# Patient Record
Sex: FEMALE | Employment: OTHER | URBAN - METROPOLITAN AREA
[De-identification: names, ages, dates, MRNs, and addresses within clinical notes are randomized per-mention and may not be internally consistent; named-entity substitution may affect disease eponyms.]

---

## 2023-10-03 ENCOUNTER — TRANSFERRED RECORDS (OUTPATIENT)
Dept: HEALTH INFORMATION MANAGEMENT | Facility: CLINIC | Age: 43
End: 2023-10-03
Payer: COMMERCIAL

## 2023-10-13 ENCOUNTER — TRANSFERRED RECORDS (OUTPATIENT)
Dept: HEALTH INFORMATION MANAGEMENT | Facility: CLINIC | Age: 43
End: 2023-10-13
Payer: COMMERCIAL

## 2023-10-18 ENCOUNTER — TRANSCRIBE ORDERS (OUTPATIENT)
Dept: OTHER | Age: 43
End: 2023-10-18

## 2023-10-18 ENCOUNTER — PRE VISIT (OUTPATIENT)
Dept: ONCOLOGY | Facility: CLINIC | Age: 43
End: 2023-10-18
Payer: COMMERCIAL

## 2023-10-18 ENCOUNTER — PATIENT OUTREACH (OUTPATIENT)
Dept: ONCOLOGY | Facility: CLINIC | Age: 43
End: 2023-10-18
Payer: COMMERCIAL

## 2023-10-18 DIAGNOSIS — N83.8 OVARIAN MASS: Primary | ICD-10-CM

## 2023-10-18 NOTE — TELEPHONE ENCOUNTER
"RECORDS STATUS - ALL OTHER DIAGNOSIS      Referring Provider/Location:  Noris Bond, [facility]  Dx and Code: Ovarian mass [N83.8]   Appt Date:  10.27.23  Provider: Ange    Action Taken  Date/Description  TJ     Pre VIsit October 24, 2023  2:36 PM   Call to Pt and the number is not working.  I called additional contact Oscar, which is Pt's signigicant other.  He was able to get a message to her and she immediately called me back.  She confirmed that all records are at [facility] and [facility] and that there are images at [facility].   Pt gave verbal permission to pull in electronic records and they are now in CE.  Pt also stated we can call Oscar anytime to get ahold of her.     Records Requested  TJ   Clinic name Comments/Action Taken   [facility] October 24, 2023 2:51 PM    Called and requested images from 2021 and 2022 be pushed to PACS    [facility] October 24, 2023 2:51 PM    Called and requested images be pushed to PACS and reports be faxed     Action October 24, 2023 3:13 PM TJ   Incoming Records Reports received from [facility] (US Pelvis and CT Abd Pelvis) and sent to [facility] for upload     Imaging Received  October 24, 2023    3:03 PM  TJ   Action: Images from [facility] and [facility] received and resolved to PACS.       RECORDS RECEIVED FROM: [employer]    Appt Date: TBD NN WQ     Action    Action Taken 10/18/2023 1:45pm RON Mcclain is in pt Tonya's chart. I can't change the appt status to \"in progress\" yet.     I called pt Tonya to get verbal consent to pull [facility] records into CE. Her phone went to  twice. I don't believe there was an option to leave a .       NOTES STATUS DETAILS   OFFICE NOTE from referring provider Complete  referral recevied from Noris Bond at [facility]   OFFICE NOTE from medical oncologist     DISCHARGE SUMMARY from hospital     DISCHARGE REPORT from the ER     OPERATIVE REPORT     MEDICATION LIST     CLINICAL TRIAL TREATMENTS TO DATE     LABS     PATHOLOGY REPORTS     ANYTHING RELATED TO " DIAGNOSIS     GENONOMIC TESTING     TYPE:     IMAGING (NEED IMAGES & REPORT)     CT SCANS     MRI     MAMMO     ULTRASOUND     PET

## 2023-10-18 NOTE — PROGRESS NOTES
New Patient Hematology / Oncology Nurse Navigator Note     Referral Date: 10/18/23    Referring provider:       Referring Clinic/Organization: [facility], [facility]      Referred to: GynOnc    Requested provider (if applicable): Dr. Guillory    Evaluation for :        Clinical History (per Nurse review of records provided):         Clinical Assessment / Barriers to Care (Per Nurse):  Pt lives in [place], [place]    Records Location: Care Everywhere     Records Needed:   Records from  per protocol- unable to pull in via CE without patient consent. Please obtain    Additional testing needed prior to consult:   N/A    Referral updates and Plan:   OUTGOING CALL to pt, number not in service.     Contacted  Complex scheduling department and spoke with Thu who states that is the only contact number they have for patient.     Sent a generic email to patient requesting call back regarding referral from . Provided my direct call back number.       Sarahi Bajwa, BSN, RN, PHN, OCN  Hematology/Oncology Nurse Navigator  [facility]  [phone]

## 2023-10-24 ENCOUNTER — DOCUMENTATION ONLY (OUTPATIENT)
Dept: ONCOLOGY | Facility: CLINIC | Age: 43
End: 2023-10-24
Payer: COMMERCIAL

## 2023-10-26 NOTE — PROGRESS NOTES
Consult Note  Gynecologic Oncology Clinic      Referring provider/Gynecologist:    Noris Bond  [facility]  [address] [place] [address]      Primary Care Provider:  Elle Barahona [facility] [address] [place] [address]           Patient: Tonya Mendoza  : 1980  Pronouns: she/her/hers       Date of Visit: Oct 27, 2023       Reason for visit: Pelvic mass.       History of Present Illness:  Tonya Mendoza is a 43 year old patient referred to the gyn onc clinic for evaluation and management of a pelvic mass. Medical history significant for class III morbid obesity, COPD, paranoid schizophrenia and anxiety, and leukemia for which she has received chemotherapy & radiation. History as follows:    22: Pelvic CT to evaluate left inguinal abscess.   PELVIC ORGANS: There is a 5.3 cm right ovarian cysts. Unremarkable left ovary and uterus.     10/3/23: Pelvic ultrasound to evaluate abnormal uterine bleeding. I have personally reviewed and interpreted the ultrasound images.  Predominantly cystic multiseptated mass with some areas that appear more solid.   UTERUS: 6.1 x 1.7 x 3.7 cm. Normal homogeneous echotexture.   ENDOMETRIUM: 7 mm.   RIGHT ADNEXA: 23.2 x 21.5 x 17.9 cm complex cystic lesion.   LEFT OVARY: Not visualized  OTHER: No significant free fluid within the cul-de-sac.      10/13/23: Abdomen, pelvic CT: I have personally reviewed and interpreted the CT images. Largely cystic, multiseptated mass with some areas which appear more solid.   ABDOMEN: BISHNU.  PELVIS: Very large pelvic lesions which appears to be a multiseptated cystic lesion measuring 23 x 17 x 23 cm. Uterus is normal in size.   No lymphadenopathy.     10/17/23: CA-125=16, CEA=3.4, Inhibin B 18.       Subjective:  Toyna Mendoza presents to the gyn onc clinic today for a scheduled consultation, accompanied by her boyfriend and PCA.   Tonya reports that she started having symptoms ~3  "months ago. Up to ~3 months ago, Tonya reports regular monthly periods. 3 months ago she started bleeding continuously with clots. She uses bladder control pads at at time, but cannot quantify how often she changes it.  She also has pain. Tonya reports that she has the \"worst pain ever.\" She reports pain from her upper abdomen, through her pelvis, the back and legs. She reports trouble walking because of pain. She reports weakness because she feels like when she stands up \"everything is going to fall out.\" Tonya has tried celebrex, tylenol, naproxen for pain with minimal relief.   At baseline, Tonya has a mobility chair that she uses most of the time. She rarely uses a cane, only uses it for very short distances. The indication for the chair was limited mobility due to Raynaud's syndrome and bad peripheral neuropathy due to uncontrolled diabetes.   Tonya reports that she cannot eat for the last month. She only tolerate liquids and soft foods. She has constipation and does not want to have bowel movements because it hurts; last bowel movement ~2 days ago. She reports pain with urination. She has urinary frequency. Tonya reports weight loss--She weighed 269 at her PCPs office last Friday (baseline at 300); 292 today. No chest pain/shortness of breath. Unknown lower extremity edema; her PCA reports she has chronic poor circulation. No other rashes, unexplained bruises, notable lymphadenopathy.      Fertility desires: Does not desire future fertility.      Medical History:  Past Medical History:   Diagnosis Date    Anxiety     COPD (chronic obstructive pulmonary disease) (H)     GERD (gastroesophageal reflux disease)     HTN (hypertension)     Leukemia (H) 2015    MDD (major depressive disorder)     Morbid obesity with BMI of 50.0-59.9, adult (H)     Paranoid schizophrenia (H)     PTSD (post-traumatic stress disorder)          Surgical History:  Past Surgical History:   Procedure Laterality Date    APPENDECTOMY      " "BREAST BIOPSY, RT/LT Right     Benign    ELBOW SURGERY Left     \"Pins in her left elbow\"    FOOT SURGERY Right     \"Plate in her foot\"          Gynecologic History:  Menstrual/Menopause status: Regular monthly periods until age 43y (see HPI).   Hormone therapy/Contraception status: None  History of abnormal cervical cancer screening:  None  History of STIS: None      Obstetric History:  OB History    Para Term  AB Living   0 0 0 0 0 0   SAB IAB Ectopic Multiple Live Births   0 0 0 0 0        Healthcare Maintenance:   Last cervical cancer screenin Result: \"Normal\" (per patient report; results not available for review)  Last breast cancer screenin22 Result: normal (fibroglandular densities)  Last colon cancer screening: Not indicated: Age <45-50 years   HPV vaccination status: Unvaccinated      Medications:   Current Outpatient Medications   Medication    albuterol (PROAIR HFA/PROVENTIL HFA/VENTOLIN HFA) 108 (90 Base) MCG/ACT inhaler    amLODIPine (NORVASC) 10 MG tablet    cariprazine (VRAYLAR) 3 MG capsule    celecoxib (CELEBREX) 100 MG capsule    cetirizine (ZYRTEC) 5 MG tablet    DULoxetine (CYMBALTA) 30 MG capsule    fluticasone (FLONASE) 50 MCG/ACT nasal spray    fluticasone-vilanterol (BREO ELLIPTA) 200-25 MCG/ACT inhaler    mometasone-formoterol (DULERA) 200-5 MCG/ACT inhaler    naproxen (NAPROSYN) 500 MG tablet    omeprazole (PRILOSEC) 40 MG DR capsule    Pregabalin (LYRICA) 200 MG capsule    sodium fluoride dental gel (PREVIDENT 5000 DRY MOUTH) 1.1 % GEL topical gel    spironolactone (ALDACTONE) 25 MG tablet    tiotropium (SPIRIVA HANDIHALER) 18 MCG inhaled capsule     No current facility-administered medications for this visit.          Allergies:   Allergies   Allergen Reactions    Codeine Nausea and Vomiting and Hives          Social History:  Patient lives independently.   Work status: on Disability. Activity: Cannot walk 1 block without shortness of breath.  Advanced " "Directives: None Desired Healthcare Power of : Partner/PCA Oscar \"Rohan\" Derek.  Social History     Tobacco Use    Smoking status: Every Day     Packs/day: 0.50     Years: 31.00     Additional pack years: 0.00     Total pack years: 15.50     Types: Cigarettes    Smokeless tobacco: Never   Substance Use Topics    Alcohol use: Never     Comment: 1/2 pint of sake on the weekends    Drug use: Yes     Types: Marijuana          Family History:  Family history unknown  Family History   Family history unknown: Yes         Physical Exam:   /84 (BP Location: Right arm, Patient Position: Sitting, Cuff Size: Adult Regular)   Pulse 91   Temp 98.5  F (36.9  C) (Oral)   Ht 1.565 m (5' 1.61\")   Wt 132.8 kg (292 lb 11.2 oz)   SpO2 94%   BMI 54.21 kg/m    Body mass index is 54.21 kg/m .     General Appearance: healthy and alert, no distress. Sitting in a hospital wheelchair with cane in hand.     HEENT:  no thyromegaly, no palpable nodules or masses        Cardiovascular: regular rate and rhythm, no gallops, rubs or murmurs     Respiratory: lungs clear, no rales, rhonchi or wheezes, normal diaphragmatic excursion    Musculoskeletal: extremities non tender and without edema    Skin: no lesions or rashes     Neurological: no gross defects     Psychiatric: appropriate mood and affect                               Hematological: normal cervical, supraclavicular and inguinal lymph nodes     Gastrointestinal:       abdomen soft, tender to moderate palpation in the upper abdomen, distended upper abdomen with firm, palpable mass.    Genitourinary: External genitalia and urethral meatus appears normal.  Vagina is smooth without nodularity or masses.  Small amount of dark blood in the vaginal vault, no clots. Cervix appears normal and without lesions.  Bimanual exam reveal no masses, nodularity or fullness.  Recto-vaginal exam deferred per patient discomfort.  Cervical cytology & HPV test specimen " collected.      Procedure Risk Statement:   The patient was counseled regarding risks, benefits and alternatives to endometrial biopsy.  Risks discussed include but not limited to:  Bleeding; infection; injury to adjacent organs; inadequate sample or false negative result.  The patient's questions were answered, and informed consent signed.       Procedure:   After patient identification was confirmed and informed consent was signed, a bivalve speculum was placed in the vagina for adequate visualization of the cervix.  The cervix and vagina were prepped with betadine. Endometrial biopsy was obtained with 1 pass, with scant tissue obtained.  Procedure aborted per patient request due to discomfort. The speculum was then removed.  The patient tolerated the procedure moderately well.       Laboratory Examination:  10/17/23 CBC: WBC 15.0, hemoglobin 13.6, platelets 491.   6/23/23 BMP: Creatinine 0.64. Sodium 135. Remainder of electrolytes within normal limits.   6/23/23 Liver panel: Albumin 3.2. Remainder of liver enzymes within normal limits.  I have independently reviewed the tumor markers detailed in the HPI.         Radiographic Examination:  I have personally reviewed and interpreted the CT and ultrasound images detailed in the HPI.        Performance Status:  ECOG Grade 3.      Assessment:  Tonya Mendoza is a 43 year old patient with a diagnosis of a complex pelvic mass concerning for a borderline tumor, possibly a benign neoplasm such as a cystadenofibroma; less likely to be an invasive ovarian cancer.     Abnormal uterine bleeding, likely due to perimenopause, but EMB pathology to evaluate for EIN/endometrial cancer pending. Concern for insufficient sample, but procedure aborted due to patient discomfort.    Encounter for cervical cancer screening.     Medical history significant for class III morbid obesity with BMI 58, COPD, paranoid schizophrenia and anxiety. History of leukemia with chronic  leukocytosis.  Surgical history significant for appendectomy due to ruptured appendix.    Fertility desires: Does not desire future fertility.       Plan:   1) Pelvic mass: I reviewed the CT images with Tonya and discussed recommended management and alternatives. Given the size of the lesion and concern for a borderline tumor, recommend definitive surgical evaluation; alternative is surveillance with serial imaging, however, a mass if this size will not resolve spontaneously.    After discussion of risks/benefits of each option, Tonya would like to proceed with the recommended surgical management. Plan for Pelvic exam under anesthesia, exploratory laparotomy, hysterectomy, unilateral oophorectomy (whichever side is enlarged), bilateral salpingectomy; possible cancer staging including omentectomy, peritoneal biopsies, possible pelvic/para-aortic lymphadenectomy; possible bilateral oophorectomy.   In the case of a borderline tumor: Will remove bilateral ovaries.   In the case of an invasive cancer of the ovary: Will remove bilateral ovaries.     The patient was counseled that the mass and uterus will be sent for frozen section pathology examination, and that subsequent surgical decisions will be made based on the pathology results.     2.) Abnormal uterine bleeding: Endometrial biopsy performed today.   -If pathology shows EIN, cancer: Will add sentinel lymph node biopsy to the procedure.   -If pathology benign or insufficient: Will send the uterus to frozen section pathology for additional assessment at the time of surgery, with staging as indicated based on results.   In the case of endometrial cancer: Will remove bilateral ovaries    3) Genetic risk factors assessed: Family history unknown. Will assess risk based on pathology.     4) Labs/tests ordered: 1) Preoperative labs: CBC with diff, type & screen, hemoglobin A1C. 2) Surgical pathology: Endometrial biopsy.  3) Screening Pap & HPV test.   Will notify patient  of results via telephone.    5) Health maintenance issues addressed today:   -Cervical cancer screening.   -Recommend smoking cessation, as this would aid in postoperative recovery.   -The patient will be seen in the PACS clinic for preoperative evaluation.     6) Code status: Full-code.    7) Prescriptions: None.    8) Preoperative teaching was completed today.  Risks of the surgery were discussed, including but not limited to:  Bleeding, possibly requiring a blood transfusion; injury to adjacent organs; postoperative complications (infection, possibly requiring antibiotics, drain placement, wound opening; blood clot and risk of pulmonary embolism); medical complications (pneumonia; stroke; heart attack; death); long-term complications (hernia; chronic pain); if lymphadenectomy performed, risk of lymphedema or lymphocele.  In the case of bilateral salpingo-oophorectomy, the patient was counseled regarding the risks of surgical menopause, including hot flashes, vaginal dryness, decreased libido, increased osteoporosis risk, possibly increased dementia risk, possible increased cardiovascular risk.  The patient was counseled that in the case of hysterectomy, they will no longer be able to become pregnant and that hysterectomy is associated with an increased risk of pelvic organ prolapse and/or urinary incontinence.  The patient was counseled that trainees such as fellows, residents and/or students may be involved in her case. Each team member will perform to his/her level of training, and may perform a pelvic exam under anesthesia.  The patient will receive antibiotic and VTE prophylaxis as indicated.       A total of 90 minutes was spent with the patient, 70 minutes of which were spent in counseling/treatment planning, 5 minutes of which were spent in procedure time; an additional 10 minutes was spent in chart review (including review of labs and radiographic images) and documentation.         Concha Cassidy MD, MS,  FACOG, FACS  10/27/2023  3:37 PM

## 2023-10-26 NOTE — PATIENT INSTRUCTIONS
SCHEDULING:  -See surgery scheduling below.  -RTC 2 weeks postop for follow-up (MD, in-person)  -Preoperative labs: CBC with diff, type & screen . --order(s) placed   -Preoperative EKG. --order(s) placed   -PACS for preoperative evaluation. --order(s) placed       DIAGNOSIS:  Large complex pelvic mass concerning for a borderline tumor, possibly a benign neoplasm such as a cystadenofibroma; less likely an invasive cancer.    Abnormal uterine bleeding likely due to perimenopause, endometrial biopsy pathology pending.      PLAN:  1.  SURGERY Date to be determined--will aim for surgery in November: Surgery to be performed at [facility]: [address], [place]  [address]:  Pelvic exam under anesthesia, exploratory laparotomy (make an incision, look inside), hysterectomy (removal of uterus/cervix), unilateral oophorectomy (removal of whichever ovary is enlarged), bilateral salpingectomy (removal of bilateral fallopian tubes); possible cancer staging including omentectomy (removal of the fat curtain hanging off the bowel), peritoneal biopsies (biopsies from the lining of the pelvis), possible pelvic/para-aortic lymphadenectomy (lymph node sampling); possible bilateral oophorectomy (removal of bilateral ovaries).   In the case of a borderline tumor: Will remove both ovaries.  In the case of an invasive cancer: Will remove both ovaries.   In the case of endometrial cancer: Will remove both ovaries.     The  mass and uterus will be sent for frozen section pathology examination, and subsequent surgical decisions will be made based on the pathology results.     2.  Preoperative tests:  1) CBC (blood count), type and screen (blood type). 2) EKG.    3.  PREOPERATIVE EVALUATION: PACS clinic for preoperative evaluation.     4.  ONE WEEK BEFORE SURGERY:  Please do not take aspirin, ibuprofen, or fish oil, as these drugs can increase your intraoperative bleeding risk; tylenol is okay to take for pain.    5.  HOSPITAL STAY:   Duration of hospital stay dictated by postoperative course.  Goals for discharge:  1) Able to tolerate at least liquids; 2) Pain tolerable with oral medications; 3) Other medical issues meet discharge criteria.    6.  FOLLOW-UP:  Return to the gyn onc clinic approximately 2 weeks after surgery for your postoperative evaluation.       POSTOP INSTRUCTIONS:  You will be discharged with ibuprofen 600 mg, which should be taken every 6 hours for the first few days after surgery, then wean off as tolerated.  You can alternate ibuprofen with acetaminophen (tylenol), with a maximum of 4g in 1 day.   You will also be discharged with a narcotic pain medication (e.g. oxycodone). This can be used in addition to ibuprofen if needed; approximately 50% need to take a few narcotic pain tablets, 50% do not need any narcotics. Wean off this medication first. Common side effects include itching, nausea, constipation, fatigue.     Take a stool softener such as senna or colace twice daily after surgery, unless you develop diarrhea.     Decreased appetite after surgery is normal. Often 6-8 very small meals per day is better tolerated than 2-3 large meals. Decreased appetite can last for up to 4-6 months after surgery.    I encourage you to walk and take the stairs. No heavy lifting: Do not lift >10 pounds for the first 4 weeks, then no more than 20 pounds for the next 4 weeks, then can increase lifting as tolerated.    Swelling of the legs/buttocks is normal for 4-6 weeks. It will eventually decrease. Please call if you have increasing pain, especially in one leg more than the other, or if one leg is significantly more swollen than the other.    You may shower the day after surgery, just let the soap and water run over the incision(s) and pat dry. No sitting in a bathtub for 4 weeks.    No driving while taking narcotic pain medications. After you discontinue the narcotic, sit in your car and make sure you can move your foot from the gas  to the break without flinching prior to driving (~1-4 weeks after surgery).     If hysterectomy performed: you may have some vaginal spotting as the top of the vagina heals, and this is normal. If you have heavy bleeding like a moderate to heavy period, or have large gushes of fluid, please call the clinic.    Please call if you have fevers 101 or greater, heavy vaginal bleeding (see above), persistent nausea/vomiting, purulent discharge and/or increasing redness at the incision sites, or other problems or concerns.   You should feel a little bit better every day; call if you start developing more pain, or more nausea.      Activity Restrictions:  -No heavy lifting (>10-20 pounds) for 8 weeks after surgery.  -Nothing in the vagina (no tampons, no douching, no sex) for 8 weeks after surgery.  -No driving while taking narcotic pain medications.  After you discontinue the narcotic, sit in your car and make sure you can move your foot from the jaleel to the brake without flinching prior to driving (approximately 1 week after surgery).       Please call with any questions or concerns. [phone]       Concha Cassidy MD, MS, FACOG, FACS  10/27/2023  3:33 PM

## 2023-10-27 ENCOUNTER — ONCOLOGY VISIT (OUTPATIENT)
Dept: ONCOLOGY | Facility: CLINIC | Age: 43
End: 2023-10-27
Attending: OBSTETRICS & GYNECOLOGY
Payer: COMMERCIAL

## 2023-10-27 VITALS
SYSTOLIC BLOOD PRESSURE: 134 MMHG | WEIGHT: 292.7 LBS | TEMPERATURE: 98.5 F | BODY MASS INDEX: 53.86 KG/M2 | HEIGHT: 62 IN | OXYGEN SATURATION: 94 % | HEART RATE: 91 BPM | DIASTOLIC BLOOD PRESSURE: 84 MMHG

## 2023-10-27 DIAGNOSIS — Z01.818 PREOP TESTING: ICD-10-CM

## 2023-10-27 DIAGNOSIS — N93.9 ABNORMAL UTERINE BLEEDING: ICD-10-CM

## 2023-10-27 DIAGNOSIS — Z01.818 PRE-OP EVALUATION: ICD-10-CM

## 2023-10-27 DIAGNOSIS — Z12.4 ENCOUNTER FOR SCREENING FOR CERVICAL CANCER: ICD-10-CM

## 2023-10-27 DIAGNOSIS — R19.00 PELVIC MASS: Primary | ICD-10-CM

## 2023-10-27 PROCEDURE — G0145 SCR C/V CYTO,THINLAYER,RESCR: HCPCS | Performed by: OBSTETRICS & GYNECOLOGY

## 2023-10-27 PROCEDURE — 87624 HPV HI-RISK TYP POOLED RSLT: CPT | Performed by: OBSTETRICS & GYNECOLOGY

## 2023-10-27 PROCEDURE — 88305 TISSUE EXAM BY PATHOLOGIST: CPT | Mod: TC | Performed by: OBSTETRICS & GYNECOLOGY

## 2023-10-27 PROCEDURE — 88305 TISSUE EXAM BY PATHOLOGIST: CPT | Mod: 26 | Performed by: PATHOLOGY

## 2023-10-27 PROCEDURE — G0463 HOSPITAL OUTPT CLINIC VISIT: HCPCS | Performed by: OBSTETRICS & GYNECOLOGY

## 2023-10-27 PROCEDURE — 99205 OFFICE O/P NEW HI 60 MIN: CPT | Mod: 25 | Performed by: OBSTETRICS & GYNECOLOGY

## 2023-10-27 PROCEDURE — 58100 BIOPSY OF UTERUS LINING: CPT | Performed by: OBSTETRICS & GYNECOLOGY

## 2023-10-27 RX ORDER — CEFAZOLIN SODIUM IN 0.9 % NACL 3 G/100 ML
3 INTRAVENOUS SOLUTION, PIGGYBACK (ML) INTRAVENOUS
Status: DISCONTINUED | OUTPATIENT
Start: 2023-10-27 | End: 2023-10-27

## 2023-10-27 RX ORDER — OMEPRAZOLE 40 MG/1
40 CAPSULE, DELAYED RELEASE ORAL
COMMUNITY
End: 2023-11-17

## 2023-10-27 RX ORDER — PREGABALIN 200 MG/1
200 CAPSULE ORAL
COMMUNITY
Start: 2022-05-12 | End: 2023-11-17

## 2023-10-27 RX ORDER — FLUTICASONE FUROATE AND VILANTEROL 200; 25 UG/1; UG/1
1 POWDER RESPIRATORY (INHALATION) EVERY MORNING
COMMUNITY
Start: 2022-08-18 | End: 2023-11-17

## 2023-10-27 RX ORDER — NAPROXEN 500 MG/1
500 TABLET ORAL
COMMUNITY
Start: 2023-06-28 | End: 2023-11-17

## 2023-10-27 RX ORDER — MOMETASONE FUROATE AND FORMOTEROL FUMARATE DIHYDRATE 200; 5 UG/1; UG/1
2 AEROSOL RESPIRATORY (INHALATION)
COMMUNITY
Start: 2022-06-26 | End: 2023-11-17

## 2023-10-27 RX ORDER — ALBUTEROL SULFATE 90 UG/1
1-2 AEROSOL, METERED RESPIRATORY (INHALATION) EVERY 6 HOURS PRN
COMMUNITY

## 2023-10-27 RX ORDER — SODIUM FLUORIDE 5 MG/G
GEL, DENTIFRICE DENTAL
COMMUNITY
Start: 2022-01-23 | End: 2023-11-17

## 2023-10-27 RX ORDER — CEFAZOLIN SODIUM IN 0.9 % NACL 3 G/100 ML
3 INTRAVENOUS SOLUTION, PIGGYBACK (ML) INTRAVENOUS
Status: CANCELLED | OUTPATIENT
Start: 2023-10-27

## 2023-10-27 RX ORDER — SPIRONOLACTONE 25 MG/1
1 TABLET ORAL DAILY
COMMUNITY
End: 2023-11-17

## 2023-10-27 RX ORDER — TIOTROPIUM BROMIDE 18 UG/1
18 CAPSULE ORAL; RESPIRATORY (INHALATION)
COMMUNITY
Start: 2022-05-24 | End: 2023-11-17

## 2023-10-27 RX ORDER — HEPARIN SODIUM 5000 [USP'U]/.5ML
5000 INJECTION, SOLUTION INTRAVENOUS; SUBCUTANEOUS
Status: CANCELLED | OUTPATIENT
Start: 2023-10-27

## 2023-10-27 RX ORDER — AMLODIPINE BESYLATE 10 MG/1
10 TABLET ORAL DAILY
COMMUNITY
Start: 2022-06-16 | End: 2023-11-17

## 2023-10-27 RX ORDER — CELECOXIB 100 MG/1
2 CAPSULE ORAL DAILY
COMMUNITY
Start: 2022-07-14

## 2023-10-27 RX ORDER — METRONIDAZOLE 500 MG/100ML
500 INJECTION, SOLUTION INTRAVENOUS
Status: CANCELLED | OUTPATIENT
Start: 2023-10-27

## 2023-10-27 RX ORDER — DULOXETIN HYDROCHLORIDE 60 MG/1
2 CAPSULE, DELAYED RELEASE ORAL EVERY MORNING
COMMUNITY

## 2023-10-27 RX ORDER — CETIRIZINE HYDROCHLORIDE 5 MG/1
1 TABLET ORAL DAILY PRN
COMMUNITY
Start: 2022-09-13 | End: 2023-11-17

## 2023-10-27 RX ORDER — FLUTICASONE PROPIONATE 50 MCG
2 SPRAY, SUSPENSION (ML) NASAL 2 TIMES DAILY
COMMUNITY
Start: 2022-06-22

## 2023-10-27 ASSESSMENT — PAIN SCALES - GENERAL: PAINLEVEL: WORST PAIN (10)

## 2023-10-27 NOTE — NURSING NOTE
Hysterectomy form completed and scanned into urgent HIM     Surgery education folder given to patient and reviewed AVS and next steps     Surgical soap given     Paris Tomas RN

## 2023-10-27 NOTE — NURSING NOTE
"Oncology Rooming Note    October 27, 2023 1:53 PM   Tonya Mendoza is a 43 year old female who presents for:    Chief Complaint   Patient presents with    Oncology Clinic Visit     Ovarian mass      Initial Vitals: /84 (BP Location: Right arm, Patient Position: Sitting, Cuff Size: Adult Regular)   Pulse 91   Ht 1.565 m (5' 1.61\")   Wt 132.8 kg (292 lb 11.2 oz)   SpO2 94%   BMI 54.21 kg/m   Estimated body mass index is 54.21 kg/m  as calculated from the following:    Height as of this encounter: 1.565 m (5' 1.61\").    Weight as of this encounter: 132.8 kg (292 lb 11.2 oz). Body surface area is 2.4 meters squared.  Worst Pain (10) Comment: Data Unavailable   No LMP recorded.  Allergies reviewed: Yes  Medications reviewed: Yes    Medications: Medication refills not needed today.  Pharmacy name entered into EPIC: Data Unavailable    Clinical concerns: States feels like stomach is going to come out, having lots of clotting blood and seeing blood in urine. States pain is 10/10 or worse.       Radha Alvarez              "

## 2023-10-27 NOTE — LETTER
10/27/2023         RE: Tonya Mendoza  [address]  [place] [address]      Consult Note  Gynecologic Oncology Clinic      Referring provider/Gynecologist:    Noris Bond  [facility]  [address] [place] [address]      Primary Care Provider[facility]  [facility] [address] [place] [address]           Patient: Tonya Mendoza  : 1980  Pronouns: she/her/hers       Date of Visit: Oct 27, 2023       Reason for visit: Pelvic mass.       History of Present Illness:  Tonya Mendoza is a 43 year old patient referred to the gyn onc clinic for evaluation and management of a pelvic mass. Medical history significant for class III morbid obesity, COPD, paranoid schizophrenia and anxiety, and leukemia for which she has received chemotherapy & radiation. History as follows:    22: Pelvic CT to evaluate left inguinal abscess.   PELVIC ORGANS: There is a 5.3 cm right ovarian cysts. Unremarkable left ovary and uterus.     10/3/23: Pelvic ultrasound to evaluate abnormal uterine bleeding. I have personally reviewed and interpreted the ultrasound images.  Predominantly cystic multiseptated mass with some areas that appear more solid.   UTERUS: 6.1 x 1.7 x 3.7 cm. Normal homogeneous echotexture.   ENDOMETRIUM: 7 mm.   RIGHT ADNEXA: 23.2 x 21.5 x 17.9 cm complex cystic lesion.   LEFT OVARY: Not visualized  OTHER: No significant free fluid within the cul-de-sac.      10/13/23: Abdomen, pelvic CT: I have personally reviewed and interpreted the CT images. Largely cystic, multiseptated mass with some areas which appear more solid.   ABDOMEN: BISHNU.  PELVIS: Very large pelvic lesions which appears to be a multiseptated cystic lesion measuring 23 x 17 x 23 cm. Uterus is normal in size.   No lymphadenopathy.     10/17/23: CA-125=16, CEA=3.4, Inhibin B 18.       Subjective:  Tonya Mendoza presents to the gyn onc clinic today for a scheduled  Yuval Freed was seen and treated in our emergency department on 1/29/2021  Diagnosis:     Pj  is off the rest of the shift today, may return to work on return date  He may return on this date: 02/03/2021         If you have any questions or concerns, please don't hesitate to call        Guilherme Lynch PA-C    ______________________________           _______________          _______________  Hospital Representative                              Date                                Time "consultation, accompanied by her boyfriend and PCA.   Tonya reports that she started having symptoms ~3 months ago. Up to ~3 months ago, Tonya reports regular monthly periods. 3 months ago she started bleeding continuously with clots. She uses bladder control pads at at time, but cannot quantify how often she changes it.  She also has pain. Tonya reports that she has the \"worst pain ever.\" She reports pain from her upper abdomen, through her pelvis, the back and legs. She reports trouble walking because of pain. She reports weakness because she feels like when she stands up \"everything is going to fall out.\" Tonya has tried celebrex, tylenol, naproxen for pain with minimal relief.   At baseline, Tonya has a mobility chair that she uses most of the time. She rarely uses a cane, only uses it for very short distances. The indication for the chair was limited mobility due to Raynaud's syndrome and bad peripheral neuropathy due to uncontrolled diabetes.   Tonya reports that she cannot eat for the last month. She only tolerate liquids and soft foods. She has constipation and does not want to have bowel movements because it hurts; last bowel movement ~2 days ago. She reports pain with urination. She has urinary frequency. Tonya reports weight loss--She weighed 269 at her PCPs office last Friday (baseline at 300); 292 today. No chest pain/shortness of breath. Unknown lower extremity edema; her PCA reports she has chronic poor circulation. No other rashes, unexplained bruises, notable lymphadenopathy.      Fertility desires: Does not desire future fertility.      Medical History:  Past Medical History:   Diagnosis Date     Anxiety      COPD (chronic obstructive pulmonary disease) (H)      GERD (gastroesophageal reflux disease)      HTN (hypertension)      Leukemia (H) 2015     MDD (major depressive disorder)      Morbid obesity with BMI of 50.0-59.9, adult (H)      Paranoid schizophrenia (H)      PTSD (post-traumatic stress " "disorder)          Surgical History:  Past Surgical History:   Procedure Laterality Date     APPENDECTOMY       BREAST BIOPSY, RT/LT Right     Benign     ELBOW SURGERY Left     \"Pins in her left elbow\"     FOOT SURGERY Right     \"Plate in her foot\"          Gynecologic History:  Menstrual/Menopause status: Regular monthly periods until age 43y (see HPI).   Hormone therapy/Contraception status: None  History of abnormal cervical cancer screening:  None  History of STIS: None      Obstetric History:  OB History    Para Term  AB Living   0 0 0 0 0 0   SAB IAB Ectopic Multiple Live Births   0 0 0 0 0        Healthcare Maintenance:   Last cervical cancer screenin Result: \"Normal\" (per patient report; results not available for review)  Last breast cancer screenin22 Result: normal (fibroglandular densities)  Last colon cancer screening: Not indicated: Age <45-50 years   HPV vaccination status: Unvaccinated      Medications:   Current Outpatient Medications   Medication     albuterol (PROAIR HFA/PROVENTIL HFA/VENTOLIN HFA) 108 (90 Base) MCG/ACT inhaler     amLODIPine (NORVASC) 10 MG tablet     cariprazine (VRAYLAR) 3 MG capsule     celecoxib (CELEBREX) 100 MG capsule     cetirizine (ZYRTEC) 5 MG tablet     DULoxetine (CYMBALTA) 30 MG capsule     fluticasone (FLONASE) 50 MCG/ACT nasal spray     fluticasone-vilanterol (BREO ELLIPTA) 200-25 MCG/ACT inhaler     mometasone-formoterol (DULERA) 200-5 MCG/ACT inhaler     naproxen (NAPROSYN) 500 MG tablet     omeprazole (PRILOSEC) 40 MG DR capsule     Pregabalin (LYRICA) 200 MG capsule     sodium fluoride dental gel (PREVIDENT 5000 DRY MOUTH) 1.1 % GEL topical gel     spironolactone (ALDACTONE) 25 MG tablet     tiotropium (SPIRIVA HANDIHALER) 18 MCG inhaled capsule     No current facility-administered medications for this visit.          Allergies:   Allergies   Allergen Reactions     Codeine Nausea and Vomiting and Hives          Social " "History:  Patient lives independently.   Work status: on Disability. Activity: Cannot walk 1 block without shortness of breath.  Advanced Directives: None Desired Healthcare Power of : Partner/PCA Oscar \"Rohan\" Derek.  Social History     Tobacco Use     Smoking status: Every Day     Packs/day: 0.50     Years: 31.00     Additional pack years: 0.00     Total pack years: 15.50     Types: Cigarettes     Smokeless tobacco: Never   Substance Use Topics     Alcohol use: Never     Comment: 1/2 pint of sake on the weekends     Drug use: Yes     Types: Marijuana          Family History:  Family history unknown  Family History   Family history unknown: Yes         Physical Exam:   /84 (BP Location: Right arm, Patient Position: Sitting, Cuff Size: Adult Regular)   Pulse 91   Temp 98.5  F (36.9  C) (Oral)   Ht 1.565 m (5' 1.61\")   Wt 132.8 kg (292 lb 11.2 oz)   SpO2 94%   BMI 54.21 kg/m    Body mass index is 54.21 kg/m .     General Appearance: healthy and alert, no distress. Sitting in a hospital wheelchair with cane in hand.     HEENT:  no thyromegaly, no palpable nodules or masses        Cardiovascular: regular rate and rhythm, no gallops, rubs or murmurs     Respiratory: lungs clear, no rales, rhonchi or wheezes, normal diaphragmatic excursion    Musculoskeletal: extremities non tender and without edema    Skin: no lesions or rashes     Neurological: no gross defects     Psychiatric: appropriate mood and affect                               Hematological: normal cervical, supraclavicular and inguinal lymph nodes     Gastrointestinal:       abdomen soft, tender to moderate palpation in the upper abdomen, distended upper abdomen with firm, palpable mass.    Genitourinary: External genitalia and urethral meatus appears normal.  Vagina is smooth without nodularity or masses.  Small amount of dark blood in the vaginal vault, no clots. Cervix appears normal and without lesions.  Bimanual exam reveal no " masses, nodularity or fullness.  Recto-vaginal exam deferred per patient discomfort.  Cervical cytology & HPV test specimen collected.      Procedure Risk Statement:   The patient was counseled regarding risks, benefits and alternatives to endometrial biopsy.  Risks discussed include but not limited to:  Bleeding; infection; injury to adjacent organs; inadequate sample or false negative result.  The patient's questions were answered, and informed consent signed.       Procedure:   After patient identification was confirmed and informed consent was signed, a bivalve speculum was placed in the vagina for adequate visualization of the cervix.  The cervix and vagina were prepped with betadine. Endometrial biopsy was obtained with 1 pass, with scant tissue obtained.  Procedure aborted per patient request due to discomfort. The speculum was then removed.  The patient tolerated the procedure moderately well.       Laboratory Examination:  10/17/23 CBC: WBC 15.0, hemoglobin 13.6, platelets 491.   6/23/23 BMP: Creatinine 0.64. Sodium 135. Remainder of electrolytes within normal limits.   6/23/23 Liver panel: Albumin 3.2. Remainder of liver enzymes within normal limits.  I have independently reviewed the tumor markers detailed in the HPI.         Radiographic Examination:  I have personally reviewed and interpreted the CT and ultrasound images detailed in the HPI.        Performance Status:  ECOG Grade 3.      Assessment:  Tonya Mendoza is a 43 year old patient with a diagnosis of a complex pelvic mass concerning for a borderline tumor, possibly a benign neoplasm such as a cystadenofibroma; less likely to be an invasive ovarian cancer.     Abnormal uterine bleeding, likely due to perimenopause, but EMB pathology to evaluate for EIN/endometrial cancer pending. Concern for insufficient sample, but procedure aborted due to patient discomfort.    Encounter for cervical cancer screening.     Medical history significant for  class III morbid obesity with BMI 58, COPD, paranoid schizophrenia and anxiety. History of leukemia with chronic leukocytosis.  Surgical history significant for appendectomy due to ruptured appendix.    Fertility desires: Does not desire future fertility.       Plan:   1) Pelvic mass: I reviewed the CT images with Tonya and discussed recommended management and alternatives. Given the size of the lesion and concern for a borderline tumor, recommend definitive surgical evaluation; alternative is surveillance with serial imaging, however, a mass if this size will not resolve spontaneously.    After discussion of risks/benefits of each option, Tonya would like to proceed with the recommended surgical management. Plan for Pelvic exam under anesthesia, exploratory laparotomy, hysterectomy, unilateral oophorectomy (whichever side is enlarged), bilateral salpingectomy; possible cancer staging including omentectomy, peritoneal biopsies, possible pelvic/para-aortic lymphadenectomy; possible bilateral oophorectomy.   In the case of a borderline tumor: Will remove bilateral ovaries.   In the case of an invasive cancer of the ovary: Will remove bilateral ovaries.     The patient was counseled that the mass and uterus will be sent for frozen section pathology examination, and that subsequent surgical decisions will be made based on the pathology results.     2.) Abnormal uterine bleeding: Endometrial biopsy performed today.   -If pathology shows EIN, cancer: Will add sentinel lymph node biopsy to the procedure.   -If pathology benign or insufficient: Will send the uterus to frozen section pathology for additional assessment at the time of surgery, with staging as indicated based on results.   In the case of endometrial cancer: Will remove bilateral ovaries    3) Genetic risk factors assessed: Family history unknown. Will assess risk based on pathology.     4) Labs/tests ordered: 1) Preoperative labs: CBC with diff, type & screen,  hemoglobin A1C. 2) Tumor marker: Inhibin B. 3) Surgical pathology: Endometrial biopsy.  4) Screening Pap & HPV test.   Will notify patient of results via telephone.    5) Health maintenance issues addressed today:   -Cervical cancer screening.   -Recommend smoking cessation, as this would aid in postoperative recovery.   -The patient will be seen in the PACS clinic for preoperative evaluation.     6) Code status: Full-code.    7) Prescriptions: None.    8) Preoperative teaching was completed today.  Risks of the surgery were discussed, including but not limited to:  Bleeding, possibly requiring a blood transfusion; injury to adjacent organs; postoperative complications (infection, possibly requiring antibiotics, drain placement, wound opening; blood clot and risk of pulmonary embolism); medical complications (pneumonia; stroke; heart attack; death); long-term complications (hernia; chronic pain); if lymphadenectomy performed, risk of lymphedema or lymphocele.  In the case of bilateral salpingo-oophorectomy, the patient was counseled regarding the risks of surgical menopause, including hot flashes, vaginal dryness, decreased libido, increased osteoporosis risk, possibly increased dementia risk, possible increased cardiovascular risk.  The patient was counseled that in the case of hysterectomy, they will no longer be able to become pregnant and that hysterectomy is associated with an increased risk of pelvic organ prolapse and/or urinary incontinence.  The patient was counseled that trainees such as fellows, residents and/or students may be involved in her case. Each team member will perform to his/her level of training, and may perform a pelvic exam under anesthesia.  The patient will receive antibiotic and VTE prophylaxis as indicated.       A total of 90 minutes was spent with the patient, 70 minutes of which were spent in counseling/treatment planning, 5 minutes of which were spent in procedure time; an additional  10 minutes was spent in chart review (including review of labs and radiographic images) and documentation.         Concha Cassidy MD, MS, FACOG, FACS  10/27/2023  3:37 PM              Concha Cassidy MD

## 2023-10-30 LAB
PATH REPORT.COMMENTS IMP SPEC: NORMAL
PATH REPORT.COMMENTS IMP SPEC: NORMAL
PATH REPORT.FINAL DX SPEC: NORMAL
PATH REPORT.GROSS SPEC: NORMAL
PATH REPORT.MICROSCOPIC SPEC OTHER STN: NORMAL
PATH REPORT.RELEVANT HX SPEC: NORMAL
PHOTO IMAGE: NORMAL

## 2023-11-01 ENCOUNTER — TELEPHONE (OUTPATIENT)
Dept: ONCOLOGY | Facility: CLINIC | Age: 43
End: 2023-11-01
Payer: COMMERCIAL

## 2023-11-01 LAB
BKR LAB AP GYN ADEQUACY: NORMAL
BKR LAB AP GYN INTERPRETATION: NORMAL
BKR LAB AP HPV REFLEX: NORMAL
BKR LAB AP PREVIOUS ABNORMAL: NORMAL
PATH REPORT.COMMENTS IMP SPEC: NORMAL
PATH REPORT.COMMENTS IMP SPEC: NORMAL
PATH REPORT.RELEVANT HX SPEC: NORMAL

## 2023-11-01 NOTE — TELEPHONE ENCOUNTER
Patient is schedule for surgery with: Dr. Cassidy  Patient contacted by BLAINE Mari    Surgery Date: 12/4     Location: [facility]    H&P: to be completed by PAC clinic - scheduled on  11/4 at 11:30 am     Post-op: will be scheduled by the clinic    Patient will receive surgery arrival and start time from PAC. Patient is aware that if times change after they see PAC, they will receive a call from the pre-admission nurses 1-2 days prior to surgery with updated arrival time and NPO instructions.    Patient aware times are subject to change up until day before surgery.     Patient questions/concerns: N/A     Surgery packet was provided to patient during appointment      Dahlia Schwarz on 11/1/2023 at 4:29 PM

## 2023-11-02 LAB
HUMAN PAPILLOMA VIRUS 16 DNA: NEGATIVE
HUMAN PAPILLOMA VIRUS 18 DNA: NEGATIVE
HUMAN PAPILLOMA VIRUS FINAL DIAGNOSIS: NORMAL
HUMAN PAPILLOMA VIRUS OTHER HR: NEGATIVE

## 2023-11-02 NOTE — TELEPHONE ENCOUNTER
FUTURE VISIT INFORMATION      SURGERY INFORMATION:  Date: 12/4/23  Location:  or  Surgeon:  Concha Cassidy MD   Anesthesia Type:  general  Procedure: Pelvic exam under anesthesia, exploratory laparotomy, hysterectomy, unilateral oophorectomy (whichever side is enlarged), bilateral salpingectomy; possible cancer staging including omentectomy, peritoneal biopsies, possible pelvic/para-aortic lymphadenectomy; possible bilateral oophorectomy.   Consult: ov 10/27    RECORDS REQUESTED FROM:       Primary Care Provider: Elle Barahona MD     Most recent EKG+ Tracing: 10/27/23    Most recent PFT's: 7/18/22- [facility]    Most recent Sleep Study:   8/10/22- [facility]

## 2023-11-03 NOTE — NURSING NOTE
Pre Op Nurse Teaching Template    Relevant Diagnosis: ovarian mass    Teaching Topic: Pelvic exam under anesthesia, exploratory laparotomy, hysterectomy, unilateral oophorectomy (whichever side is enlarged), bilateral salpingectomy; possible cancer staging including omentectomy, peritoneal biopsies, possible pelvic/para-aortic lymphadenectomy; possible bilateral oophorectomy.     Person(s) involved in teaching :  Patient    Motivation Level:  Asks Questions:    Yes      Eager to Learn:     Yes     Cooperative:          Yes    Receptive (willing. Able to accept information):    Yes      Patient and those who are listed above demonstrates understanding of the following:   Reason for the appointment, diagnosis and treatment plan:   Yes   Knowledge of proper use of medications and conditions for which they are ordered (with special attention to potential side effects or drug interactions): Yes   Which situations necessitate calling provider and whom to contact: Yes         Nutritional needs and diet plan:  Yes      Pain management techniques:     Yes, Pain Scale   Diet:   Yes    Teaching Concerns addressed: Yes    Infection Prevention:  Patient and those who are listed above demonstrate understanding of the following:  Pre-Op CHG Bathing Instructions: Yes  Surgical procedure site care taught:   Yes   Signs and symptoms of infection taught: Yes       Instructional Materials Used/Given:  The Before Your Surgery Booklet  Showering before Surgery instructions & CHG Product  Pain Assessment Tool   Home Care after Surgery  Eating after surgery  Map  Accommodations Brochure  Phone numbers for clinic and on call doctor  Copy of Surgical Consent    Done Today:  ,   Preop Visit needed with PAC/  Tests Ordered:  Post Op Visit Scheduled:TBD  Comments:    Surgery date/time:TBD        Consent signed via IPAD and on file in media  Hysterectomy consent signed and sent to scanning  .

## 2023-11-14 ENCOUNTER — PRE VISIT (OUTPATIENT)
Dept: SURGERY | Facility: CLINIC | Age: 43
End: 2023-11-14

## 2023-11-16 LAB
ABO/RH(D): NORMAL
ANTIBODY SCREEN: NEGATIVE
SPECIMEN EXPIRATION DATE: NORMAL

## 2023-11-17 ENCOUNTER — PRE VISIT (OUTPATIENT)
Dept: SURGERY | Facility: CLINIC | Age: 43
End: 2023-11-17

## 2023-11-17 ENCOUNTER — ANESTHESIA EVENT (OUTPATIENT)
Dept: SURGERY | Facility: CLINIC | Age: 43
DRG: 742 | End: 2023-11-17
Payer: COMMERCIAL

## 2023-11-17 ENCOUNTER — OFFICE VISIT (OUTPATIENT)
Dept: SURGERY | Facility: CLINIC | Age: 43
End: 2023-11-17
Payer: COMMERCIAL

## 2023-11-17 ENCOUNTER — LAB (OUTPATIENT)
Dept: LAB | Facility: CLINIC | Age: 43
End: 2023-11-17
Attending: CLINICAL NURSE SPECIALIST
Payer: COMMERCIAL

## 2023-11-17 VITALS
HEIGHT: 59 IN | BODY MASS INDEX: 57.45 KG/M2 | RESPIRATION RATE: 14 BRPM | DIASTOLIC BLOOD PRESSURE: 67 MMHG | OXYGEN SATURATION: 96 % | HEART RATE: 78 BPM | SYSTOLIC BLOOD PRESSURE: 121 MMHG | WEIGHT: 285 LBS | TEMPERATURE: 97.7 F

## 2023-11-17 DIAGNOSIS — Z01.818 PREOP EXAMINATION: ICD-10-CM

## 2023-11-17 DIAGNOSIS — N93.9 ABNORMAL UTERINE BLEEDING (AUB): ICD-10-CM

## 2023-11-17 DIAGNOSIS — R19.00 PELVIC MASS: ICD-10-CM

## 2023-11-17 DIAGNOSIS — Z01.818 PREOP EXAMINATION: Primary | ICD-10-CM

## 2023-11-17 PROCEDURE — 36415 COLL VENOUS BLD VENIPUNCTURE: CPT | Performed by: PATHOLOGY

## 2023-11-17 PROCEDURE — 99204 OFFICE O/P NEW MOD 45 MIN: CPT | Performed by: CLINICAL NURSE SPECIALIST

## 2023-11-17 PROCEDURE — 86850 RBC ANTIBODY SCREEN: CPT | Performed by: CLINICAL NURSE SPECIALIST

## 2023-11-17 PROCEDURE — 86901 BLOOD TYPING SEROLOGIC RH(D): CPT | Performed by: CLINICAL NURSE SPECIALIST

## 2023-11-17 RX ORDER — AMITRIPTYLINE HYDROCHLORIDE 50 MG/1
50 TABLET ORAL AT BEDTIME
COMMUNITY

## 2023-11-17 RX ORDER — FAMOTIDINE 20 MG/1
20 TABLET, FILM COATED ORAL 2 TIMES DAILY
COMMUNITY
End: 2023-12-03

## 2023-11-17 RX ORDER — IPRATROPIUM BROMIDE AND ALBUTEROL SULFATE 2.5; .5 MG/3ML; MG/3ML
1 SOLUTION RESPIRATORY (INHALATION) EVERY 6 HOURS PRN
COMMUNITY

## 2023-11-17 RX ORDER — FLUTICASONE PROPIONATE 110 UG/1
2 AEROSOL, METERED RESPIRATORY (INHALATION) 2 TIMES DAILY
COMMUNITY
Start: 2023-11-14

## 2023-11-17 RX ORDER — ACETAMINOPHEN 500 MG
500 TABLET ORAL EVERY 8 HOURS PRN
COMMUNITY
Start: 2023-11-14 | End: 2023-12-03

## 2023-11-17 RX ORDER — ARIPIPRAZOLE 15 MG/1
1 TABLET ORAL EVERY MORNING
COMMUNITY

## 2023-11-17 ASSESSMENT — COPD QUESTIONNAIRES: COPD: 1

## 2023-11-17 ASSESSMENT — ENCOUNTER SYMPTOMS
DYSRHYTHMIAS: 0
SEIZURES: 0

## 2023-11-17 ASSESSMENT — LIFESTYLE VARIABLES: TOBACCO_USE: 1

## 2023-11-17 ASSESSMENT — PAIN SCALES - GENERAL: PAINLEVEL: SEVERE PAIN (7)

## 2023-11-17 NOTE — PATIENT INSTRUCTIONS
Name:  Tonya Mendoza   MRN:  [id number]   :  1980   Today's Date:  2023         You were seen today for a pre-operative assessment in the:    Pre-operative Anesthesia Assessment Center(PAC)  [facility]  [address] [place] [id number]  phone [phone]      You will be receiving a call with location, date, arrival time and diet instructions from Preadmission Nursing at your surgical site:    -[facility]: [phone]      Anesthesia recommendations for medications:    Hold Aspirin for 7 days before procedure.  Hold Multivitamins for 7 days before procedure.   Hold Herbal medications and Supplements for 7 days before procedure.  Hold Ibuprofen for 1 day before procedure.   Hold Naproxen for 4 days before procedure.   Hold Celecoxib(Celebrex) for 3 days prior to surgery, last dose 23.     No alcohol or cannabis products for 24 hours before your procedure         Please take these medications the day of procedure:    Acetaminophen(Tylenol) as needed  Albuterol Inhaler as needed and bring the day of surgery  Aripiprazole(Abilify)  Cariprazine(Vraylar)  Duloxetine(Cymbalta)  Famotidine(Pepcid)  Flovent Inhaler  Flonase Nasal spray  Duo Neb as needed      How do I prepare myself?  - Please take 2 showers (one the night prior to surgery and one the morning of surgery) using Scrubcare or Hibiclens soap.    Use this soap only from the neck to your toes.     Leave the soap on your skin for one minute--then rinse thoroughly.      You may use your own shampoo and conditioner. No other hair products.   - Please remove all jewelry and body piercings.  - No lotions, deodorants or fragrance.  - No makeup or fingernail polish.   - Bring your ID and insurance card.    -If you have a Deep Brain Stimulator, a Spinal Cord Stimulator, or any implanted Neuro Device, you must bring the remote to your appointment       Use Aerobika following Inhalers up to 10 breaths up to 3  times per day over a period of 10-20 minutes each.         For further questions regarding your surgery please call your surgeon's office.
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
## 2023-11-30 ENCOUNTER — TELEPHONE (OUTPATIENT)
Dept: SURGERY | Facility: CLINIC | Age: 43
End: 2023-11-30
Payer: COMMERCIAL

## 2023-12-03 RX ORDER — CETIRIZINE HYDROCHLORIDE 10 MG/1
10 TABLET ORAL DAILY
COMMUNITY

## 2023-12-03 RX ORDER — HYDROCHLOROTHIAZIDE 12.5 MG/1
12.5 CAPSULE ORAL EVERY MORNING
COMMUNITY

## 2023-12-04 ENCOUNTER — HOSPITAL ENCOUNTER (INPATIENT)
Facility: CLINIC | Age: 43
LOS: 2 days | Discharge: HOME | DRG: 742 | End: 2023-12-06
Attending: OBSTETRICS & GYNECOLOGY | Admitting: OBSTETRICS & GYNECOLOGY
Payer: COMMERCIAL

## 2023-12-04 ENCOUNTER — TELEPHONE (OUTPATIENT)
Dept: ONCOLOGY | Facility: CLINIC | Age: 43
End: 2023-12-04

## 2023-12-04 ENCOUNTER — ANESTHESIA (OUTPATIENT)
Dept: SURGERY | Facility: CLINIC | Age: 43
DRG: 742 | End: 2023-12-04
Payer: COMMERCIAL

## 2023-12-04 DIAGNOSIS — Z01.818 PREOP TESTING: ICD-10-CM

## 2023-12-04 DIAGNOSIS — Z98.890 S/P LAPAROTOMY: Primary | ICD-10-CM

## 2023-12-04 LAB
ABO/RH(D): NORMAL
ANTIBODY SCREEN: NEGATIVE
GLUCOSE SERPL-MCNC: 111 MG/DL (ref 70–99)
HCG SERPL QL: NEGATIVE
POTASSIUM SERPL-SCNC: 3.6 MMOL/L (ref 3.4–5.3)
SPECIMEN EXPIRATION DATE: NORMAL

## 2023-12-04 PROCEDURE — 250N000011 HC RX IP 250 OP 636: Performed by: OBSTETRICS & GYNECOLOGY

## 2023-12-04 PROCEDURE — 250N000009 HC RX 250: Performed by: NURSE ANESTHETIST, CERTIFIED REGISTERED

## 2023-12-04 PROCEDURE — 0UT90ZZ RESECTION OF UTERUS, OPEN APPROACH: ICD-10-PCS | Performed by: OBSTETRICS & GYNECOLOGY

## 2023-12-04 PROCEDURE — 88307 TISSUE EXAM BY PATHOLOGIST: CPT | Mod: 26 | Performed by: PATHOLOGY

## 2023-12-04 PROCEDURE — 88331 PATH CONSLTJ SURG 1 BLK 1SPC: CPT | Mod: 26 | Performed by: PATHOLOGY

## 2023-12-04 PROCEDURE — 86901 BLOOD TYPING SEROLOGIC RH(D): CPT | Performed by: OBSTETRICS & GYNECOLOGY

## 2023-12-04 PROCEDURE — 88331 PATH CONSLTJ SURG 1 BLK 1SPC: CPT | Mod: TC | Performed by: OBSTETRICS & GYNECOLOGY

## 2023-12-04 PROCEDURE — 88329 PATH CONSLTJ DRG SURG: CPT | Performed by: PATHOLOGY

## 2023-12-04 PROCEDURE — 82947 ASSAY GLUCOSE BLOOD QUANT: CPT | Performed by: ANESTHESIOLOGY

## 2023-12-04 PROCEDURE — 84132 ASSAY OF SERUM POTASSIUM: CPT | Performed by: ANESTHESIOLOGY

## 2023-12-04 PROCEDURE — 88112 CYTOPATH CELL ENHANCE TECH: CPT | Mod: TC | Performed by: OBSTETRICS & GYNECOLOGY

## 2023-12-04 PROCEDURE — 250N000011 HC RX IP 250 OP 636: Performed by: ANESTHESIOLOGY

## 2023-12-04 PROCEDURE — 250N000013 HC RX MED GY IP 250 OP 250 PS 637: Performed by: NURSE ANESTHETIST, CERTIFIED REGISTERED

## 2023-12-04 PROCEDURE — 120N000001 HC R&B MED SURG/OB

## 2023-12-04 PROCEDURE — 710N000009 HC RECOVERY PHASE 1, LEVEL 1, PER MIN: Performed by: OBSTETRICS & GYNECOLOGY

## 2023-12-04 PROCEDURE — 250N000009 HC RX 250: Performed by: OBSTETRICS & GYNECOLOGY

## 2023-12-04 PROCEDURE — 999N000141 HC STATISTIC PRE-PROCEDURE NURSING ASSESSMENT: Performed by: OBSTETRICS & GYNECOLOGY

## 2023-12-04 PROCEDURE — 250N000011 HC RX IP 250 OP 636: Performed by: NURSE ANESTHETIST, CERTIFIED REGISTERED

## 2023-12-04 PROCEDURE — 272N000001 HC OR GENERAL SUPPLY STERILE: Performed by: OBSTETRICS & GYNECOLOGY

## 2023-12-04 PROCEDURE — 86850 RBC ANTIBODY SCREEN: CPT | Performed by: OBSTETRICS & GYNECOLOGY

## 2023-12-04 PROCEDURE — 360N000076 HC SURGERY LEVEL 3, PER MIN: Performed by: OBSTETRICS & GYNECOLOGY

## 2023-12-04 PROCEDURE — 0UT70ZZ RESECTION OF BILATERAL FALLOPIAN TUBES, OPEN APPROACH: ICD-10-PCS | Performed by: OBSTETRICS & GYNECOLOGY

## 2023-12-04 PROCEDURE — 250N000013 HC RX MED GY IP 250 OP 250 PS 637: Performed by: OBSTETRICS & GYNECOLOGY

## 2023-12-04 PROCEDURE — 84703 CHORIONIC GONADOTROPIN ASSAY: CPT | Performed by: ANESTHESIOLOGY

## 2023-12-04 PROCEDURE — 88305 TISSUE EXAM BY PATHOLOGIST: CPT | Mod: TC | Performed by: OBSTETRICS & GYNECOLOGY

## 2023-12-04 PROCEDURE — 88305 TISSUE EXAM BY PATHOLOGIST: CPT | Mod: 26 | Performed by: PATHOLOGY

## 2023-12-04 PROCEDURE — 250N000025 HC SEVOFLURANE, PER MIN: Performed by: OBSTETRICS & GYNECOLOGY

## 2023-12-04 PROCEDURE — 0UT00ZZ RESECTION OF RIGHT OVARY, OPEN APPROACH: ICD-10-PCS | Performed by: OBSTETRICS & GYNECOLOGY

## 2023-12-04 PROCEDURE — 370N000017 HC ANESTHESIA TECHNICAL FEE, PER MIN: Performed by: OBSTETRICS & GYNECOLOGY

## 2023-12-04 PROCEDURE — 258N000003 HC RX IP 258 OP 636: Performed by: ANESTHESIOLOGY

## 2023-12-04 PROCEDURE — 36415 COLL VENOUS BLD VENIPUNCTURE: CPT | Performed by: ANESTHESIOLOGY

## 2023-12-04 PROCEDURE — 258N000003 HC RX IP 258 OP 636: Performed by: NURSE ANESTHETIST, CERTIFIED REGISTERED

## 2023-12-04 PROCEDURE — 88332 PATH CONSLTJ SURG EA ADD BLK: CPT | Mod: 26 | Performed by: PATHOLOGY

## 2023-12-04 RX ORDER — PROPOFOL 10 MG/ML
INJECTION, EMULSION INTRAVENOUS PRN
Status: DISCONTINUED | OUTPATIENT
Start: 2023-12-04 | End: 2023-12-04

## 2023-12-04 RX ORDER — ONDANSETRON 4 MG/1
4 TABLET, ORALLY DISINTEGRATING ORAL EVERY 30 MIN PRN
Status: DISCONTINUED | OUTPATIENT
Start: 2023-12-04 | End: 2023-12-04 | Stop reason: HOSPADM

## 2023-12-04 RX ORDER — ACETAMINOPHEN 325 MG/1
650 TABLET ORAL EVERY 6 HOURS
Status: DISCONTINUED | OUTPATIENT
Start: 2023-12-04 | End: 2023-12-06 | Stop reason: HOSPADM

## 2023-12-04 RX ORDER — SODIUM CHLORIDE, SODIUM LACTATE, POTASSIUM CHLORIDE, CALCIUM CHLORIDE 600; 310; 30; 20 MG/100ML; MG/100ML; MG/100ML; MG/100ML
INJECTION, SOLUTION INTRAVENOUS CONTINUOUS
Status: DISCONTINUED | OUTPATIENT
Start: 2023-12-04 | End: 2023-12-04 | Stop reason: HOSPADM

## 2023-12-04 RX ORDER — PANTOPRAZOLE SODIUM 40 MG/1
40 TABLET, DELAYED RELEASE ORAL 2 TIMES DAILY
Status: DISCONTINUED | OUTPATIENT
Start: 2023-12-05 | End: 2023-12-06 | Stop reason: HOSPADM

## 2023-12-04 RX ORDER — LABETALOL HYDROCHLORIDE 5 MG/ML
5 INJECTION, SOLUTION INTRAVENOUS ONCE
Status: COMPLETED | OUTPATIENT
Start: 2023-12-04 | End: 2023-12-04

## 2023-12-04 RX ORDER — NALOXONE HYDROCHLORIDE 0.4 MG/ML
0.2 INJECTION, SOLUTION INTRAMUSCULAR; INTRAVENOUS; SUBCUTANEOUS
Status: DISCONTINUED | OUTPATIENT
Start: 2023-12-04 | End: 2023-12-06 | Stop reason: HOSPADM

## 2023-12-04 RX ORDER — ARIPIPRAZOLE 5 MG/1
15 TABLET ORAL EVERY MORNING
Status: DISCONTINUED | OUTPATIENT
Start: 2023-12-05 | End: 2023-12-06 | Stop reason: HOSPADM

## 2023-12-04 RX ORDER — ENOXAPARIN SODIUM 100 MG/ML
40 INJECTION SUBCUTANEOUS EVERY 12 HOURS
Status: DISCONTINUED | OUTPATIENT
Start: 2023-12-05 | End: 2023-12-06 | Stop reason: HOSPADM

## 2023-12-04 RX ORDER — FENTANYL CITRATE 50 UG/ML
INJECTION, SOLUTION INTRAMUSCULAR; INTRAVENOUS PRN
Status: DISCONTINUED | OUTPATIENT
Start: 2023-12-04 | End: 2023-12-04

## 2023-12-04 RX ORDER — HYDROMORPHONE HCL IN WATER/PF 6 MG/30 ML
0.2 PATIENT CONTROLLED ANALGESIA SYRINGE INTRAVENOUS EVERY 5 MIN PRN
Status: DISCONTINUED | OUTPATIENT
Start: 2023-12-04 | End: 2023-12-04 | Stop reason: HOSPADM

## 2023-12-04 RX ORDER — LIDOCAINE 40 MG/G
CREAM TOPICAL
Status: DISCONTINUED | OUTPATIENT
Start: 2023-12-04 | End: 2023-12-06 | Stop reason: HOSPADM

## 2023-12-04 RX ORDER — OXYCODONE HYDROCHLORIDE 5 MG/1
10 TABLET ORAL EVERY 4 HOURS PRN
Status: DISCONTINUED | OUTPATIENT
Start: 2023-12-04 | End: 2023-12-06 | Stop reason: HOSPADM

## 2023-12-04 RX ORDER — ONDANSETRON 2 MG/ML
INJECTION INTRAMUSCULAR; INTRAVENOUS PRN
Status: DISCONTINUED | OUTPATIENT
Start: 2023-12-04 | End: 2023-12-04

## 2023-12-04 RX ORDER — FLUTICASONE PROPIONATE 50 MCG
2 SPRAY, SUSPENSION (ML) NASAL 2 TIMES DAILY PRN
Status: DISCONTINUED | OUTPATIENT
Start: 2023-12-04 | End: 2023-12-06 | Stop reason: HOSPADM

## 2023-12-04 RX ORDER — DULOXETIN HYDROCHLORIDE 60 MG/1
120 CAPSULE, DELAYED RELEASE ORAL EVERY MORNING
Status: DISCONTINUED | OUTPATIENT
Start: 2023-12-05 | End: 2023-12-06 | Stop reason: HOSPADM

## 2023-12-04 RX ORDER — SODIUM CHLORIDE, SODIUM LACTATE, POTASSIUM CHLORIDE, CALCIUM CHLORIDE 600; 310; 30; 20 MG/100ML; MG/100ML; MG/100ML; MG/100ML
INJECTION, SOLUTION INTRAVENOUS CONTINUOUS PRN
Status: DISCONTINUED | OUTPATIENT
Start: 2023-12-04 | End: 2023-12-04

## 2023-12-04 RX ORDER — ONDANSETRON 2 MG/ML
4 INJECTION INTRAMUSCULAR; INTRAVENOUS EVERY 30 MIN PRN
Status: DISCONTINUED | OUTPATIENT
Start: 2023-12-04 | End: 2023-12-04 | Stop reason: HOSPADM

## 2023-12-04 RX ORDER — DEXMEDETOMIDINE HYDROCHLORIDE 4 UG/ML
INJECTION, SOLUTION INTRAVENOUS PRN
Status: DISCONTINUED | OUTPATIENT
Start: 2023-12-04 | End: 2023-12-04

## 2023-12-04 RX ORDER — KETAMINE HYDROCHLORIDE 10 MG/ML
INJECTION INTRAMUSCULAR; INTRAVENOUS PRN
Status: DISCONTINUED | OUTPATIENT
Start: 2023-12-04 | End: 2023-12-04

## 2023-12-04 RX ORDER — ALBUTEROL SULFATE 90 UG/1
1-2 AEROSOL, METERED RESPIRATORY (INHALATION) EVERY 6 HOURS PRN
Status: DISCONTINUED | OUTPATIENT
Start: 2023-12-04 | End: 2023-12-06 | Stop reason: HOSPADM

## 2023-12-04 RX ORDER — CALCIUM CARBONATE 500 MG/1
500 TABLET, CHEWABLE ORAL 4 TIMES DAILY PRN
Status: DISCONTINUED | OUTPATIENT
Start: 2023-12-04 | End: 2023-12-06 | Stop reason: HOSPADM

## 2023-12-04 RX ORDER — OXYCODONE HYDROCHLORIDE 5 MG/1
5 TABLET ORAL EVERY 4 HOURS PRN
Status: DISCONTINUED | OUTPATIENT
Start: 2023-12-04 | End: 2023-12-06 | Stop reason: HOSPADM

## 2023-12-04 RX ORDER — CEFAZOLIN SODIUM/WATER 3 G/30 ML
3 SYRINGE (ML) INTRAVENOUS
Status: COMPLETED | OUTPATIENT
Start: 2023-12-04 | End: 2023-12-04

## 2023-12-04 RX ORDER — NALOXONE HYDROCHLORIDE 0.4 MG/ML
0.4 INJECTION, SOLUTION INTRAMUSCULAR; INTRAVENOUS; SUBCUTANEOUS
Status: DISCONTINUED | OUTPATIENT
Start: 2023-12-04 | End: 2023-12-06 | Stop reason: HOSPADM

## 2023-12-04 RX ORDER — SODIUM CHLORIDE 9 MG/ML
INJECTION, SOLUTION INTRAVENOUS CONTINUOUS PRN
Status: DISCONTINUED | OUTPATIENT
Start: 2023-12-04 | End: 2023-12-04

## 2023-12-04 RX ORDER — METRONIDAZOLE 500 MG/100ML
500 INJECTION, SOLUTION INTRAVENOUS
Status: DISCONTINUED | OUTPATIENT
Start: 2023-12-04 | End: 2023-12-04 | Stop reason: HOSPADM

## 2023-12-04 RX ORDER — LIDOCAINE HYDROCHLORIDE 20 MG/ML
INJECTION, SOLUTION INFILTRATION; PERINEURAL PRN
Status: DISCONTINUED | OUTPATIENT
Start: 2023-12-04 | End: 2023-12-04

## 2023-12-04 RX ORDER — DIPHENHYDRAMINE HYDROCHLORIDE 50 MG/ML
25 INJECTION INTRAMUSCULAR; INTRAVENOUS EVERY 6 HOURS PRN
Status: DISCONTINUED | OUTPATIENT
Start: 2023-12-04 | End: 2023-12-06 | Stop reason: HOSPADM

## 2023-12-04 RX ORDER — AMITRIPTYLINE HYDROCHLORIDE 50 MG/1
50 TABLET ORAL AT BEDTIME
Status: DISCONTINUED | OUTPATIENT
Start: 2023-12-04 | End: 2023-12-06 | Stop reason: HOSPADM

## 2023-12-04 RX ORDER — FENTANYL CITRATE 0.05 MG/ML
25 INJECTION, SOLUTION INTRAMUSCULAR; INTRAVENOUS EVERY 5 MIN PRN
Status: DISCONTINUED | OUTPATIENT
Start: 2023-12-04 | End: 2023-12-04 | Stop reason: HOSPADM

## 2023-12-04 RX ORDER — BISACODYL 10 MG
10 SUPPOSITORY, RECTAL RECTAL DAILY
Status: DISCONTINUED | OUTPATIENT
Start: 2023-12-05 | End: 2023-12-06 | Stop reason: HOSPADM

## 2023-12-04 RX ORDER — AMOXICILLIN 250 MG
2 CAPSULE ORAL 2 TIMES DAILY
Status: DISCONTINUED | OUTPATIENT
Start: 2023-12-04 | End: 2023-12-06 | Stop reason: HOSPADM

## 2023-12-04 RX ORDER — HYDROMORPHONE HCL IN WATER/PF 6 MG/30 ML
0.4 PATIENT CONTROLLED ANALGESIA SYRINGE INTRAVENOUS EVERY 5 MIN PRN
Status: DISCONTINUED | OUTPATIENT
Start: 2023-12-04 | End: 2023-12-04 | Stop reason: HOSPADM

## 2023-12-04 RX ORDER — FAMOTIDINE 20 MG/1
20 TABLET, FILM COATED ORAL 2 TIMES DAILY
Status: DISCONTINUED | OUTPATIENT
Start: 2023-12-04 | End: 2023-12-06 | Stop reason: HOSPADM

## 2023-12-04 RX ORDER — CETIRIZINE HYDROCHLORIDE 10 MG/1
10 TABLET ORAL DAILY
Status: DISCONTINUED | OUTPATIENT
Start: 2023-12-04 | End: 2023-12-06 | Stop reason: HOSPADM

## 2023-12-04 RX ORDER — ALBUTEROL SULFATE 90 UG/1
AEROSOL, METERED RESPIRATORY (INHALATION) PRN
Status: DISCONTINUED | OUTPATIENT
Start: 2023-12-04 | End: 2023-12-04

## 2023-12-04 RX ORDER — FENTANYL CITRATE 0.05 MG/ML
50 INJECTION, SOLUTION INTRAMUSCULAR; INTRAVENOUS EVERY 5 MIN PRN
Status: DISCONTINUED | OUTPATIENT
Start: 2023-12-04 | End: 2023-12-04 | Stop reason: HOSPADM

## 2023-12-04 RX ORDER — HYDROMORPHONE HCL IN WATER/PF 6 MG/30 ML
0.2 PATIENT CONTROLLED ANALGESIA SYRINGE INTRAVENOUS
Status: COMPLETED | OUTPATIENT
Start: 2023-12-04 | End: 2023-12-05

## 2023-12-04 RX ORDER — ONDANSETRON 4 MG/1
4 TABLET, ORALLY DISINTEGRATING ORAL EVERY 6 HOURS PRN
Status: DISCONTINUED | OUTPATIENT
Start: 2023-12-04 | End: 2023-12-06 | Stop reason: HOSPADM

## 2023-12-04 RX ORDER — IPRATROPIUM BROMIDE AND ALBUTEROL SULFATE 2.5; .5 MG/3ML; MG/3ML
1 SOLUTION RESPIRATORY (INHALATION) EVERY 6 HOURS PRN
Status: DISCONTINUED | OUTPATIENT
Start: 2023-12-04 | End: 2023-12-06 | Stop reason: HOSPADM

## 2023-12-04 RX ORDER — MAGNESIUM HYDROXIDE 1200 MG/15ML
LIQUID ORAL PRN
Status: DISCONTINUED | OUTPATIENT
Start: 2023-12-04 | End: 2023-12-04 | Stop reason: HOSPADM

## 2023-12-04 RX ORDER — HEPARIN SODIUM 5000 [USP'U]/.5ML
5000 INJECTION, SOLUTION INTRAVENOUS; SUBCUTANEOUS
Status: COMPLETED | OUTPATIENT
Start: 2023-12-04 | End: 2023-12-04

## 2023-12-04 RX ORDER — DEXAMETHASONE SODIUM PHOSPHATE 4 MG/ML
INJECTION, SOLUTION INTRA-ARTICULAR; INTRALESIONAL; INTRAMUSCULAR; INTRAVENOUS; SOFT TISSUE PRN
Status: DISCONTINUED | OUTPATIENT
Start: 2023-12-04 | End: 2023-12-04

## 2023-12-04 RX ORDER — SODIUM CHLORIDE, SODIUM LACTATE, POTASSIUM CHLORIDE, CALCIUM CHLORIDE 600; 310; 30; 20 MG/100ML; MG/100ML; MG/100ML; MG/100ML
INJECTION, SOLUTION INTRAVENOUS CONTINUOUS
Status: DISCONTINUED | OUTPATIENT
Start: 2023-12-04 | End: 2023-12-05

## 2023-12-04 RX ORDER — DIPHENHYDRAMINE HCL 25 MG
25 CAPSULE ORAL EVERY 6 HOURS PRN
Status: DISCONTINUED | OUTPATIENT
Start: 2023-12-04 | End: 2023-12-06 | Stop reason: HOSPADM

## 2023-12-04 RX ORDER — HYDROCHLOROTHIAZIDE 12.5 MG/1
12.5 CAPSULE ORAL EVERY MORNING
Status: DISCONTINUED | OUTPATIENT
Start: 2023-12-05 | End: 2023-12-06 | Stop reason: HOSPADM

## 2023-12-04 RX ORDER — AMOXICILLIN 250 MG
1 CAPSULE ORAL 2 TIMES DAILY
Status: DISCONTINUED | OUTPATIENT
Start: 2023-12-04 | End: 2023-12-06 | Stop reason: HOSPADM

## 2023-12-04 RX ORDER — SIMETHICONE 80 MG
80 TABLET,CHEWABLE ORAL 4 TIMES DAILY PRN
Status: DISCONTINUED | OUTPATIENT
Start: 2023-12-04 | End: 2023-12-06 | Stop reason: HOSPADM

## 2023-12-04 RX ADMIN — Medication 20 MG: at 12:23

## 2023-12-04 RX ADMIN — DOCUSATE SODIUM 50 MG AND SENNOSIDES 8.6 MG 1 TABLET: 8.6; 5 TABLET, FILM COATED ORAL at 21:30

## 2023-12-04 RX ADMIN — FAMOTIDINE 20 MG: 20 TABLET, FILM COATED ORAL at 21:30

## 2023-12-04 RX ADMIN — ONDANSETRON 4 MG: 2 INJECTION INTRAMUSCULAR; INTRAVENOUS at 15:03

## 2023-12-04 RX ADMIN — HYDROMORPHONE HYDROCHLORIDE 0.2 MG: 0.2 INJECTION, SOLUTION INTRAMUSCULAR; INTRAVENOUS; SUBCUTANEOUS at 17:35

## 2023-12-04 RX ADMIN — ALBUTEROL SULFATE 4 PUFF: 90 AEROSOL, METERED RESPIRATORY (INHALATION) at 11:35

## 2023-12-04 RX ADMIN — LABETALOL HYDROCHLORIDE 5 MG: 5 INJECTION INTRAVENOUS at 14:50

## 2023-12-04 RX ADMIN — AMITRIPTYLINE HYDROCHLORIDE 50 MG: 50 TABLET, FILM COATED ORAL at 21:30

## 2023-12-04 RX ADMIN — HYDROMORPHONE HYDROCHLORIDE 0.4 MG: 0.2 INJECTION, SOLUTION INTRAMUSCULAR; INTRAVENOUS; SUBCUTANEOUS at 14:44

## 2023-12-04 RX ADMIN — SODIUM CHLORIDE: 9 INJECTION, SOLUTION INTRAVENOUS at 12:58

## 2023-12-04 RX ADMIN — SUGAMMADEX 270 MG: 100 INJECTION, SOLUTION INTRAVENOUS at 13:37

## 2023-12-04 RX ADMIN — ROCURONIUM BROMIDE 20 MG: 50 INJECTION, SOLUTION INTRAVENOUS at 11:30

## 2023-12-04 RX ADMIN — HEPARIN SODIUM 5000 UNITS: 5000 INJECTION, SOLUTION INTRAVENOUS; SUBCUTANEOUS at 08:35

## 2023-12-04 RX ADMIN — ONDANSETRON 4 MG: 2 INJECTION INTRAMUSCULAR; INTRAVENOUS at 13:21

## 2023-12-04 RX ADMIN — Medication 10 MG: at 12:48

## 2023-12-04 RX ADMIN — FENTANYL CITRATE 50 MCG: 50 INJECTION, SOLUTION INTRAMUSCULAR; INTRAVENOUS at 14:20

## 2023-12-04 RX ADMIN — DEXMEDETOMIDINE HYDROCHLORIDE 20 MCG: 200 INJECTION INTRAVENOUS at 11:56

## 2023-12-04 RX ADMIN — LIDOCAINE HYDROCHLORIDE 100 MG: 20 INJECTION, SOLUTION INFILTRATION; PERINEURAL at 10:36

## 2023-12-04 RX ADMIN — DEXAMETHASONE SODIUM PHOSPHATE 4 MG: 4 INJECTION, SOLUTION INTRA-ARTICULAR; INTRALESIONAL; INTRAMUSCULAR; INTRAVENOUS; SOFT TISSUE at 10:50

## 2023-12-04 RX ADMIN — ONDANSETRON 4 MG: 4 TABLET, ORALLY DISINTEGRATING ORAL at 21:24

## 2023-12-04 RX ADMIN — ROCURONIUM BROMIDE 20 MG: 50 INJECTION, SOLUTION INTRAVENOUS at 12:00

## 2023-12-04 RX ADMIN — SODIUM CHLORIDE, SODIUM LACTATE, POTASSIUM CHLORIDE, CALCIUM CHLORIDE: 600; 310; 30; 20 INJECTION, SOLUTION INTRAVENOUS at 10:47

## 2023-12-04 RX ADMIN — ROCURONIUM BROMIDE 50 MG: 50 INJECTION, SOLUTION INTRAVENOUS at 10:36

## 2023-12-04 RX ADMIN — ROCURONIUM BROMIDE 30 MG: 50 INJECTION, SOLUTION INTRAVENOUS at 11:05

## 2023-12-04 RX ADMIN — FENTANYL CITRATE 100 MCG: 50 INJECTION INTRAMUSCULAR; INTRAVENOUS at 11:00

## 2023-12-04 RX ADMIN — HYDROMORPHONE HYDROCHLORIDE 0.2 MG: 0.2 INJECTION, SOLUTION INTRAMUSCULAR; INTRAVENOUS; SUBCUTANEOUS at 21:25

## 2023-12-04 RX ADMIN — MIDAZOLAM 2 MG: 1 INJECTION INTRAMUSCULAR; INTRAVENOUS at 10:27

## 2023-12-04 RX ADMIN — ALBUTEROL SULFATE 2 PUFF: 90 AEROSOL, METERED RESPIRATORY (INHALATION) at 13:12

## 2023-12-04 RX ADMIN — Medication 3 G: at 10:41

## 2023-12-04 RX ADMIN — ROCURONIUM BROMIDE 10 MG: 50 INJECTION, SOLUTION INTRAVENOUS at 12:30

## 2023-12-04 RX ADMIN — DEXMEDETOMIDINE HYDROCHLORIDE 20 MCG: 200 INJECTION INTRAVENOUS at 12:42

## 2023-12-04 RX ADMIN — ALBUTEROL SULFATE 2 PUFF: 90 AEROSOL, METERED RESPIRATORY (INHALATION) at 10:28

## 2023-12-04 RX ADMIN — ACETAMINOPHEN 650 MG: 325 TABLET, FILM COATED ORAL at 21:30

## 2023-12-04 RX ADMIN — METRONIDAZOLE 500 MG: 500 INJECTION, SOLUTION INTRAVENOUS at 08:59

## 2023-12-04 RX ADMIN — SODIUM CHLORIDE, POTASSIUM CHLORIDE, SODIUM LACTATE AND CALCIUM CHLORIDE: 600; 310; 30; 20 INJECTION, SOLUTION INTRAVENOUS at 10:27

## 2023-12-04 RX ADMIN — PROPOFOL 200 MG: 10 INJECTION, EMULSION INTRAVENOUS at 10:36

## 2023-12-04 RX ADMIN — SODIUM CHLORIDE, POTASSIUM CHLORIDE, SODIUM LACTATE AND CALCIUM CHLORIDE: 600; 310; 30; 20 INJECTION, SOLUTION INTRAVENOUS at 08:42

## 2023-12-04 RX ADMIN — FENTANYL CITRATE 100 MCG: 50 INJECTION INTRAMUSCULAR; INTRAVENOUS at 10:36

## 2023-12-04 RX ADMIN — ALBUTEROL SULFATE 2 PUFF: 90 AEROSOL, METERED RESPIRATORY (INHALATION) at 10:29

## 2023-12-04 RX ADMIN — FENTANYL CITRATE 50 MCG: 50 INJECTION INTRAMUSCULAR; INTRAVENOUS at 12:25

## 2023-12-04 ASSESSMENT — ACTIVITIES OF DAILY LIVING (ADL)
ADLS_ACUITY_SCORE: 25
ADLS_ACUITY_SCORE: 25
ADLS_ACUITY_SCORE: 29
ADLS_ACUITY_SCORE: 29
ADLS_ACUITY_SCORE: 25
ADLS_ACUITY_SCORE: 29

## 2023-12-04 ASSESSMENT — COPD QUESTIONNAIRES: COPD: 1

## 2023-12-04 ASSESSMENT — ENCOUNTER SYMPTOMS: SEIZURES: 0

## 2023-12-04 NOTE — OP NOTE
[facility] Operative Note    Pre-operative diagnosis: 1) Abnormal uterine bleeding [N93.9]  2) 23 cm Pelvic mass [R19.00]   Post-operative diagnosis 1) Abnormal uterine bleeding with normal endometrium, final pathology pending.  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    Procedure: Procedure(s):  Pelvic exam under anesthesia, exploratory laparotomy, hysterectomy, right oophorectomy, bilateral salpingectomy, evacuation of pelvic fluid   Surgeon: Concha Cassidy MD   Assistants(s): Barbara Self MD (PGY3)  Quynh Pace MD (PGY3)  Marta Wilson MD (Fellow)   Estimated blood loss: 300 mL    Specimens: 1) Pelvic fluid for cytology; 2) Right ovary & fallopian tube to frozen; 3) Uterus, cervix, left fallopian tube to frozen.   Findings: On pelvic exam under anesthesia, there was a large mass filling the pelvis and extending into the upper abdomen. Mass was mobile. Cervix and vagina were grossly normal. On laparotomy, there was a small amount of serosanguinous fluid in the pelvis, which was removed and sent for cytology. The 23 cm mass was arising from the right ovary. Frozen section pathology examination showed a benign mucinous cystadenoma, final pathology pending. Left ovary was grossly normal and left in situ. Uterus, cervix, bilateral fallopian tubes were grossly normal. Frozen section pathology examination of the uterus showed a normal endometrium. Remainder of the pelvic/abdominal survey was significant for diffuse inflammation of the pelvic peritoneum without nodularity, and filmy adhesions between loops of small bowel, but bowel otherwise normal in appearance. There were filmy adhesions between the liver and the diaphragm.        Procedure:   The patient was taken to the operating room where general endotracheal anesthesia was administered and found to be adequate. She was placed in dorsal lithotomy position, and pelvic exam under anesthesia was performed with  the findings as noted above. She was then prepped and draped in the normal sterile fashion.     A vertical midline abdominal incision was made in the usual fashion. A pelvic and abdominal survey was performed, with the findings as noted above. The pelvic fluid was evacuated and sent for cytology.     The large pelvic mass was delivered through the incision, and the utero-ovarian ligament and infundibulopelvic ligament clamped, divided and suture ligated just proximal to the insertion at the ovary. The right ovarian and fallopian tube were then sent to frozen section pathology examination, with the findings as noted above. The Bookwalter abdominal wall retractor was then assembled.      The bilateral round ligaments were cauterized and divided, and the retroperitoneal space was opened bilaterally for adequate visualization of the infundibulopelvic ligaments and ureters. The right infundibulopelvic ligament was further skeletonized, and clamped, divided and double suture ligated proximal to the original incision. On the left side, the mesosalpinx was cauterized and divided in the avascular sections, and the vessels between the left fallopian tube and ovary were skeletonized, clamped, divided and suture ligated, leaving the left ovary in situ.  The peritoneal incision was then extended toward the uterus, and a bladder flap was created, taking the bladder down off the lower uterine segment and cervix. The bilateral uterine arteries were skeletonized, suture ligated and divided down to the level of the cervicovaginal junction. A colpotomy incision was made sharply, amputating the cervix from the vagina. The vaginal cuff was closed with a Ambar stitch at each angle, and figure-of-eight stitches in the middle using 0-vicryl suture.      The pelvis was irrigated, and the vaginal cuff  and all vascular pedicles were found to be hemostatic. The bilateral ureters were palpated from the pelvic brim to the level of insertion at  the bladder and found to be intact and without obstruction. The bowel was then unpacked, and the Bookwalter abdominal wall retractor taken down.      The fascia was closed with a running stitch of 0-PDS suture, taking bites 0.5 cm apart and 0.5 cm deep. The subcutaneous tissue was then irrigated and the subcutaneous space closed with a running stitch of 2-0 vicryl. The skin was closed with a subcuticular stitch using 4-0 monocryl, and covered with Prineo mesh dressing.      The patient tolerated the procedure well. Sponge, instrument and needle count were correct x2 at the end of the procedure. The patient was extubated in the OR and taken in stable condition to the PACU.       Concha Cassidy MD, MS, FACOG, FACS  12/4/2023  1:26 PM

## 2023-12-04 NOTE — ANESTHESIA PROCEDURE NOTES
Airway       Patient location: [facility] - Operating Room or Procedural Area.       Procedure Start/Stop Times: 12/4/2023 10:40 AM  Staff -        Anesthesiologist:  Leticia Morton       CRNA: Sarah Huddleston APRN CRNA       Performed By: CRNAIndications and Patient Condition       Indications for airway management: denis-procedural and airway protection       Induction type:intravenous       Mask difficulty assessment: 2 - vent by mask + OA or adjuvant +/- NMBA    Final Airway Details       Final airway type: endotracheal airway       Successful airway: ETT - single  Endotracheal Airway Details        ETT size (mm): 7.0       Cuffed: yes       Successful intubation technique: video laryngoscopy       VL Blade Size: Glidescope 3       Grade View of Cords: 1       Adjucts: stylet       Position: Right       Measured from: gums/teeth       Secured at (cm): 21       Bite block used: None    Post intubation assessment        Placement verified by: capnometry, equal breath sounds and chest rise        Number of attempts at approach: 1       Number of other approaches attempted: 0       Secured with: pink tape and tape       Ease of procedure: easy       Dentition: Intact and Unchanged    Medication(s) Administered   Medication Administration Time: 12/4/2023 10:40 AM

## 2023-12-04 NOTE — PROGRESS NOTES
PTA medications updated by Medication Scribe prior to surgery via phone call with patient (last doses completed by Nurse)     Medication history sources: Surescripts and H&P  In the past week, patient estimated taking medication this percent of the time: Greater than 90%      Significant changes made to the medication list:  None      Additional medication history information:   None    Medication reconciliation completed by provider prior to medication history? No    Time spent in this activity: 30 MINUTES    The information provided in this note is only as accurate as the sources available at the time of update(s)      Prior to Admission medications    Medication Sig Last Dose Taking? Auth Provider Long Term End Date   albuterol (PROAIR HFA/PROVENTIL HFA/VENTOLIN HFA) 108 (90 Base) MCG/ACT inhaler Inhale 1-2 puffs into the lungs every 6 hours as needed  at AM Yes Reported, Patient Yes    amitriptyline (ELAVIL) 50 MG tablet Take 50 mg by mouth at bedtime  at PM Yes Reported, Patient Yes    ARIPiprazole (ABILIFY) 15 MG tablet Take 1 tablet by mouth every morning  at AM Yes Reported, Patient     cariprazine (VRAYLAR) 3 MG capsule Take 1 capsule by mouth every morning  at AM Yes Reported, Patient     celecoxib (CELEBREX) 100 MG capsule Take 2 capsules by mouth daily  Yes Reported, Patient Yes    cetirizine (ZYRTEC) 10 MG tablet Take 10 mg by mouth daily  at AM Yes Reported, Patient     DULoxetine (CYMBALTA) 60 MG capsule Take 2 capsules by mouth every morning  at AM Yes Reported, Patient Yes    fluticasone (FLONASE) 50 MCG/ACT nasal spray Spray 2 sprays into both nostrils 2 times daily  at PRN Yes Reported, Patient     fluticasone (FLOVENT HFA) 110 MCG/ACT inhaler Inhale 2 puffs into the lungs 2 times daily  at AM Yes Reported, Patient Yes    hydrochlorothiazide (MICROZIDE) 12.5 MG capsule Take 12.5 mg by mouth every morning  at AM Yes Reported, Patient     ipratropium - albuterol 0.5 mg/2.5 mg/3 mL (DUONEB) 0.5-2.5  (3) MG/3ML neb solution Take 1 vial by nebulization every 6 hours as needed for shortness of breath, wheezing or cough  at AM Yes Reported, Patient     omeprazole (PRILOSEC) 20 MG DR capsule Take 2 capsules by mouth daily  at AM Yes Reported, Patient

## 2023-12-04 NOTE — PROGRESS NOTES
Gynecologic Oncology Postoperative Check Note  12/4/2023    S: Patient reports she is doing okay postoperatively, having quite a bit of abdominal pain at the moment. Pain does improve with pain medications. Has not ambulated yet, no lightheadedness or dizziness at rest. Arredondo in place. Not yet passing flatus. Denies any nausea or vomiting, was able to tolerate small amount of PO. Denies chest pain, shortness of breath, dizziness, or other concerns at this time.    O:  Vitals:    12/04/23 1444 12/04/23 1450 12/04/23 1500 12/04/23 1552   BP: (!) 177/97  (!) 163/96 (!) 153/91   BP Location:    Left arm   Cuff Size:       Pulse: 84 81 82 73   Resp: 17 30 28 24   Temp:   98.2  F (36.8  C) 98.6  F (37  C)   TempSrc:   Temporal Oral   SpO2: 94% 95% 95% 97%   Weight:       Height:         Gen: NAD, resting in bed  Cardio: RRR, S1/S2, no murmurs  Resp: CTAB, no wheezing or crackles. Coarse upper airway sounds.  Abdomen: Soft, appropriately tender, non-distended, incision with dressing overlying, no shadowing  Extremities: Non-tender, no significant edema, SCDs in place    Assessment/Plan: 43 year old POD#0 s/p XL, MELECIO, RSO, LS for large pelvic mass arising from right ovary. Doing well in early postoperative period.      Disease: 23cm R ovarian mass, frozen: benign      # Postoperative cares:   - FEN: ADAT, Reg diet, LR 100cc/hr  - Pain: Barb Tylenol, Ibuprofen, Oxycodone, IV Dilaudid prn  - Heme: Hgb 11.7> . No signs or symptoms of acute blood loss anemia, will check CBC in AM   - GI: Barb bowel reg, prn antiemetics   - :  arredondo in place, likely removal POD#1  - ID: S/p pre-op abx     # H/o COPD, asthma   # QUEENIE  - Baseline O2 requirement of 2L, continue in postoperative period   - Continue CPAP overnight   - Continue PTA flovent, albuterol, duoneb, certazine, flonase.    Chronic/Stable Conditions:  # HTN - PTA hydrochlorothiazide held, restart prn  # H/o Leukemia - chronic leukocytosis, baseline 14  # TUD - nicotine  replacement prn  # GERD - PTA prilosec ord'd    # Paranoid schizoprenia, BEKAH, PTSD - Continue PTA Abilify, Cymbalta, Cariprazine, amitriptyline   # H/o TIA   # Chronic back/ankle pain - PTA Celebrex held     PPX: SCDs, IS  Drains/Lines: PIV x2, arredondo    Dispo: Discharge to home vs rehab when meeting goals, likely POD#2-3      To contact the [facility] Gynecology Oncology team:  Weekdays M-F 0600AM - 1800PM Pager: [phone]  Overnights and Weekends Pager: [phone]    Quynh Pace MD  OB/GYN Resident PGY-4  5:44 PM December 4, 2023

## 2023-12-04 NOTE — ANESTHESIA POSTPROCEDURE EVALUATION
Patient: Tonya Mendoza    Procedure: Procedure(s):  Pelvic exam under anesthesia, exploratory laparotomy, hysterectomy, right oophorectomy, bilateral salpingectomy, evacuation of pelvic fluid  Exam under anesthesia pelvic       Anesthesia Type:  General    Note:  Disposition: Inpatient   Postop Pain Control: Uneventful            Sign Out: Well controlled pain   PONV: No   Neuro/Psych: Uneventful            Sign Out: Acceptable/Baseline neuro status   Airway/Respiratory: Uneventful            Sign Out: Acceptable/Baseline resp. status   CV/Hemodynamics: Uneventful            Sign Out: Acceptable CV status; No obvious hypovolemia; No obvious fluid overload   Other NRE: NONE   DID A NON-ROUTINE EVENT OCCUR?            Last vitals:  Vitals Value Taken Time   /95 12/04/23 1515   Temp 36.8  C (98.2  F) 12/04/23 1500   Pulse 71 12/04/23 1520   Resp 30 12/04/23 1520   SpO2 96 % 12/04/23 1520   Vitals shown include unfiled device data.    Electronically Signed By: Leticia Morton  December 4, 2023  3:56 PM

## 2023-12-04 NOTE — ANESTHESIA PREPROCEDURE EVALUATION
"Anesthesia Pre-Procedure Evaluation    Patient: Tonya Mendoza   MRN: [id number] : 1980        Procedure : Procedure(s):  Pelvic exam under anesthesia, exploratory laparotomy, hysterectomy, unilateral oophorectomy (whichever side is enlarged), bilateral salpingectomy; possible cancer staging including omentectomy, peritoneal biopsies, possible pelvic/para-aortic lymphadenectomy; possible bilateral oophorectomy.          Past Medical History:   Diagnosis Date    Abnormal uterine bleeding (AUB)     Anxiety     Arthritis     COPD (chronic obstructive pulmonary disease) (H)     Depressive disorder     GERD (gastroesophageal reflux disease)     HTN (hypertension)     Leukemia (H) 2015    MDD (major depressive disorder)     Morbid obesity with BMI of 50.0-59.9, adult (H)     Paranoid schizophrenia (H)     Pelvic mass     PTSD (post-traumatic stress disorder)     Sleep apnea     TIA (transient ischemic attack) 2015    Uncomplicated asthma       Past Surgical History:   Procedure Laterality Date    APPENDECTOMY      BREAST BIOPSY, RT/LT Right     Benign    ELBOW SURGERY Left     \"Pins in her left elbow\"    FOOT SURGERY Right     \"Plate in her foot\"      Allergies   Allergen Reactions    Codeine Nausea and Vomiting and Hives      Social History     Tobacco Use    Smoking status: Every Day     Packs/day: 0.50     Years: 31.00     Additional pack years: 0.00     Total pack years: 15.50     Types: Cigarettes    Smokeless tobacco: Never    Tobacco comments:     10 Cigarettes daily, trying to quit   Substance Use Topics    Alcohol use: Yes     Comment: occassional      Wt Readings from Last 1 Encounters:   23 128.4 kg (283 lb)        Anesthesia Evaluation   Pt has had prior anesthetic.     History of anesthetic complications       ROS/MED HX  ENT/Pulmonary: Comment: Progressive longstanding sob per patient.  Xray negative    (+) sleep apnea, uses CPAP,                   asthma    COPD,            " "  Neurologic:    (-) no seizures and no CVA   Cardiovascular:     (+)  hypertension- -   -  - -                                      METS/Exercise Tolerance:     Hematologic:       Musculoskeletal:       GI/Hepatic:     (+) GERD, Asymptomatic on medication,                  Renal/Genitourinary:       Endo:     (+)               Obesity,       Psychiatric/Substance Use:       Infectious Disease:       Malignancy:       Other: Comment: Right adnexal mass           Physical Exam    Airway        Mallampati: III   TM distance: > 3 FB   Neck ROM: full   Mouth opening: > 3 cm    Respiratory Devices and Support         Dental       (+) Modest Abnormalities - crowns, retainers, 1 or 2 missing teeth      Cardiovascular   cardiovascular exam normal          Pulmonary   pulmonary exam normal                OUTSIDE LABS:  CBC: No results found for: \"WBC\", \"HGB\", \"HCT\", \"PLT\"  BMP:   Lab Results   Component Value Date    POTASSIUM 3.6 12/04/2023     (H) 12/04/2023     COAGS: No results found for: \"PTT\", \"INR\", \"FIBR\"  POC:   Lab Results   Component Value Date    HCGS Negative 12/04/2023     HEPATIC: No results found for: \"ALBUMIN\", \"PROTTOTAL\", \"ALT\", \"AST\", \"GGT\", \"ALKPHOS\", \"BILITOTAL\", \"BILIDIRECT\", \"BJ\"  OTHER: No results found for: \"PH\", \"LACT\", \"A1C\", \"JAMMIE\", \"PHOS\", \"MAG\", \"LIPASE\", \"AMYLASE\", \"TSH\", \"T4\", \"T3\", \"CRP\", \"SED\"    Anesthesia Plan    ASA Status:  3       Anesthesia Type: General.     - Airway: ETT   Induction: Intravenous.   Maintenance: Balanced.   Techniques and Equipment:     - Airway: Video-Laryngoscope     - Lines/Monitors: 2nd IV     Consents    Anesthesia Plan(s) and associated risks, benefits, and realistic alternatives discussed. Questions answered and patient/representative(s) expressed understanding.     - Discussed:     - Discussed with:  Patient            Postoperative Care    Pain management: IV analgesics, Oral pain medications.   PONV prophylaxis: Ondansetron (or other 5HT-3), " "Dexamethasone or Solumedrol, Background Propofol Infusion     Comments:               Leticia Morton    I have reviewed the pertinent notes and labs in the chart from the past 30 days and (re)examined the patient.  Any updates or changes from those notes are reflected in this note.              # Severe Obesity: Estimated body mass index is 57.16 kg/m  as calculated from the following:    Height as of this encounter: 1.499 m (4' 11\").    Weight as of this encounter: 128.4 kg (283 lb).      "

## 2023-12-04 NOTE — TELEPHONE ENCOUNTER
Called Tonya's  Veda per Tonya's request. Reviewed the intraoperative findings and procedure performed. Plan for admission for postoperative care.      Concha Cassidy MD, MS, FACOG, FACS  12/4/2023  1:27 PM    
Bladder non-tender and non-distended. Urine clear yellow.

## 2023-12-04 NOTE — ANESTHESIA CARE TRANSFER NOTE
Patient: Tonya Mendoza    Procedure: Procedure(s):  Pelvic exam under anesthesia, exploratory laparotomy, hysterectomy, right oophorectomy, bilateral salpingectomy, evacuation of pelvic fluid       Diagnosis: Abnormal uterine bleeding [N93.9]  Pelvic mass [R19.00]  Diagnosis Additional Information: No value filed.    Anesthesia Type:   General     Note:    Oropharynx: oropharynx clear of all foreign objects and spontaneously breathing  Level of Consciousness: awake  Oxygen Supplementation: face mask  Level of Supplemental Oxygen (L/min / FiO2): 6  Independent Airway: airway patency satisfactory and stable  Dentition: dentition unchanged  Vital Signs Stable: post-procedure vital signs reviewed and stable  Report to RN Given: handoff report given  Patient transferred to: PACU    Handoff Report: Identifed the Patient, Identified the Reponsible Provider, Reviewed the pertinent medical history, Discussed the surgical course, Reviewed Intra-OP anesthesia mangement and issues during anesthesia, Set expectations for post-procedure period and Allowed opportunity for questions and acknowledgement of understanding      Vitals:  Vitals Value Taken Time   BP     Temp     Pulse 83 12/04/23 1353   Resp 41 12/04/23 1353   SpO2 99 % 12/04/23 1353   Vitals shown include unfiled device data.    Electronically Signed By: JAYCEE Torres CRNA  December 4, 2023  1:54 PM

## 2023-12-04 NOTE — PROVIDER NOTIFICATION
Dr Morton notified pt update, BPs elevated, unchanging with pain control. OK for 5 labetalol, VORB.     Updated Dr Morton with post admin vitals-ok for transfer to floor.

## 2023-12-05 ENCOUNTER — APPOINTMENT (OUTPATIENT)
Dept: OCCUPATIONAL THERAPY | Facility: CLINIC | Age: 43
DRG: 742 | End: 2023-12-05
Attending: OBSTETRICS & GYNECOLOGY
Payer: COMMERCIAL

## 2023-12-05 ENCOUNTER — APPOINTMENT (OUTPATIENT)
Dept: PHYSICAL THERAPY | Facility: CLINIC | Age: 43
DRG: 742 | End: 2023-12-05
Attending: OBSTETRICS & GYNECOLOGY
Payer: COMMERCIAL

## 2023-12-05 LAB
ANION GAP SERPL CALCULATED.3IONS-SCNC: 6 MMOL/L (ref 7–15)
BUN SERPL-MCNC: 4.8 MG/DL (ref 6–20)
CALCIUM SERPL-MCNC: 8.4 MG/DL (ref 8.6–10)
CHLORIDE SERPL-SCNC: 101 MMOL/L (ref 98–107)
CREAT SERPL-MCNC: 0.56 MG/DL (ref 0.51–0.95)
DEPRECATED HCO3 PLAS-SCNC: 28 MMOL/L (ref 22–29)
EGFRCR SERPLBLD CKD-EPI 2021: >90 ML/MIN/1.73M2
ERYTHROCYTE [DISTWIDTH] IN BLOOD BY AUTOMATED COUNT: 14.6 % (ref 10–15)
GLUCOSE BLDC GLUCOMTR-MCNC: 121 MG/DL (ref 70–99)
GLUCOSE SERPL-MCNC: 163 MG/DL (ref 70–99)
HCT VFR BLD AUTO: 32.7 % (ref 35–47)
HGB BLD-MCNC: 10.4 G/DL (ref 11.7–15.7)
MCH RBC QN AUTO: 28.8 PG (ref 26.5–33)
MCHC RBC AUTO-ENTMCNC: 31.8 G/DL (ref 31.5–36.5)
MCV RBC AUTO: 91 FL (ref 78–100)
PLATELET # BLD AUTO: 389 10E3/UL (ref 150–450)
POTASSIUM SERPL-SCNC: 3.4 MMOL/L (ref 3.4–5.3)
RBC # BLD AUTO: 3.61 10E6/UL (ref 3.8–5.2)
SODIUM SERPL-SCNC: 135 MMOL/L (ref 135–145)
WBC # BLD AUTO: 11.8 10E3/UL (ref 4–11)

## 2023-12-05 PROCEDURE — 80048 BASIC METABOLIC PNL TOTAL CA: CPT | Performed by: OBSTETRICS & GYNECOLOGY

## 2023-12-05 PROCEDURE — 97161 PT EVAL LOW COMPLEX 20 MIN: CPT | Mod: GP | Performed by: PHYSICAL THERAPIST

## 2023-12-05 PROCEDURE — 258N000003 HC RX IP 258 OP 636: Performed by: OBSTETRICS & GYNECOLOGY

## 2023-12-05 PROCEDURE — 250N000013 HC RX MED GY IP 250 OP 250 PS 637: Performed by: OBSTETRICS & GYNECOLOGY

## 2023-12-05 PROCEDURE — 99024 POSTOP FOLLOW-UP VISIT: CPT | Mod: GC | Performed by: OBSTETRICS & GYNECOLOGY

## 2023-12-05 PROCEDURE — 120N000001 HC R&B MED SURG/OB

## 2023-12-05 PROCEDURE — 36415 COLL VENOUS BLD VENIPUNCTURE: CPT | Performed by: OBSTETRICS & GYNECOLOGY

## 2023-12-05 PROCEDURE — 85027 COMPLETE CBC AUTOMATED: CPT | Performed by: OBSTETRICS & GYNECOLOGY

## 2023-12-05 PROCEDURE — 250N000011 HC RX IP 250 OP 636: Mod: JZ | Performed by: OBSTETRICS & GYNECOLOGY

## 2023-12-05 PROCEDURE — 97165 OT EVAL LOW COMPLEX 30 MIN: CPT | Mod: GO | Performed by: OCCUPATIONAL THERAPIST

## 2023-12-05 PROCEDURE — 97530 THERAPEUTIC ACTIVITIES: CPT | Mod: GP | Performed by: PHYSICAL THERAPIST

## 2023-12-05 RX ADMIN — BISACODYL 10 MG: 10 SUPPOSITORY RECTAL at 08:28

## 2023-12-05 RX ADMIN — OXYCODONE HYDROCHLORIDE 5 MG: 5 TABLET ORAL at 10:04

## 2023-12-05 RX ADMIN — FAMOTIDINE 20 MG: 20 TABLET, FILM COATED ORAL at 10:03

## 2023-12-05 RX ADMIN — OXYCODONE HYDROCHLORIDE 10 MG: 5 TABLET ORAL at 15:40

## 2023-12-05 RX ADMIN — DOCUSATE SODIUM 50 MG AND SENNOSIDES 8.6 MG 2 TABLET: 8.6; 5 TABLET, FILM COATED ORAL at 10:03

## 2023-12-05 RX ADMIN — PANTOPRAZOLE SODIUM 40 MG: 40 TABLET, DELAYED RELEASE ORAL at 21:06

## 2023-12-05 RX ADMIN — CETIRIZINE HYDROCHLORIDE 10 MG: 10 TABLET, FILM COATED ORAL at 10:03

## 2023-12-05 RX ADMIN — FAMOTIDINE 20 MG: 20 TABLET, FILM COATED ORAL at 21:06

## 2023-12-05 RX ADMIN — DULOXETINE HYDROCHLORIDE 120 MG: 60 CAPSULE, DELAYED RELEASE ORAL at 10:04

## 2023-12-05 RX ADMIN — ACETAMINOPHEN 650 MG: 325 TABLET, FILM COATED ORAL at 21:07

## 2023-12-05 RX ADMIN — ACETAMINOPHEN 650 MG: 325 TABLET, FILM COATED ORAL at 15:40

## 2023-12-05 RX ADMIN — ACETAMINOPHEN 650 MG: 325 TABLET, FILM COATED ORAL at 05:18

## 2023-12-05 RX ADMIN — FLUTICASONE FUROATE 1 PUFF: 100 POWDER RESPIRATORY (INHALATION) at 10:04

## 2023-12-05 RX ADMIN — SODIUM CHLORIDE, POTASSIUM CHLORIDE, SODIUM LACTATE AND CALCIUM CHLORIDE: 600; 310; 30; 20 INJECTION, SOLUTION INTRAVENOUS at 05:55

## 2023-12-05 RX ADMIN — ENOXAPARIN SODIUM 40 MG: 40 INJECTION SUBCUTANEOUS at 21:06

## 2023-12-05 RX ADMIN — PANTOPRAZOLE SODIUM 40 MG: 40 TABLET, DELAYED RELEASE ORAL at 10:03

## 2023-12-05 RX ADMIN — OXYCODONE HYDROCHLORIDE 10 MG: 5 TABLET ORAL at 21:23

## 2023-12-05 RX ADMIN — HYDROMORPHONE HYDROCHLORIDE 0.2 MG: 0.2 INJECTION, SOLUTION INTRAMUSCULAR; INTRAVENOUS; SUBCUTANEOUS at 05:13

## 2023-12-05 RX ADMIN — DOCUSATE SODIUM 50 MG AND SENNOSIDES 8.6 MG 2 TABLET: 8.6; 5 TABLET, FILM COATED ORAL at 21:07

## 2023-12-05 RX ADMIN — ACETAMINOPHEN 650 MG: 325 TABLET, FILM COATED ORAL at 10:03

## 2023-12-05 RX ADMIN — AMITRIPTYLINE HYDROCHLORIDE 50 MG: 50 TABLET, FILM COATED ORAL at 21:06

## 2023-12-05 RX ADMIN — CARIPRAZINE 3 MG: 1.5 CAPSULE, GELATIN COATED ORAL at 10:03

## 2023-12-05 RX ADMIN — ENOXAPARIN SODIUM 40 MG: 40 INJECTION SUBCUTANEOUS at 10:04

## 2023-12-05 ASSESSMENT — ACTIVITIES OF DAILY LIVING (ADL)
ADLS_ACUITY_SCORE: 30
ADLS_ACUITY_SCORE: 31
ADLS_ACUITY_SCORE: 31
ADLS_ACUITY_SCORE: 30
ADLS_ACUITY_SCORE: 30
ADLS_ACUITY_SCORE: 31
ADLS_ACUITY_SCORE: 29
ADLS_ACUITY_SCORE: 29
ADLS_ACUITY_SCORE: 30
ADLS_ACUITY_SCORE: 30
ADLS_ACUITY_SCORE: 29
ADLS_ACUITY_SCORE: 30

## 2023-12-05 NOTE — PLAN OF CARE
Turn and repo in bed. Attempted to get out of bed but pt was in too much pain. Dilaudid given. Incision CDI, ice applied. Capno. Pt wears 2L oxygen at home, continued here. Welch. Scant vaginal bleeding

## 2023-12-05 NOTE — PROGRESS NOTES
Attempted to meet with patient for consults. Prefers to discuss tomorrow, wants to sleep today.     Per chart review has PCA Veda Macario at phone [phone].    Has electric scooter, hospital bed, cpap and oxygen at [facility] [place].    Also has JUMA Darling.     Case management will follow up with patient tomorrow as requested.     Paris Richardson RN   [facility]   Phone [phone]

## 2023-12-05 NOTE — PROGRESS NOTES
Brief Interval Progress Note    PM Check in with patient RN.     Per patient nurse, she states that patient is doing really well, she has sat up in the chair. She personally walked with the patient in the hallway - she is using a walker but feels like this is overall going well. Patients pain is well controlled with PO oxy, abdominal binder. Patient has been tolerating PO. Welch was removed this morning and patient has been voiding throughout the day. Did receive a suppository and had a very small bowel movement. Continues on 2L O2 (chronic, baseline use). S/p PT/OT evaluations. No concerns at this time.     Patient Vitals for the past 24 hrs:   BP Temp Temp src Pulse Resp SpO2   12/05/23 1553 (!) 158/97 97.8  F (36.6  C) Oral 100 16 98 %   12/05/23 0748 (!) 152/89 98  F (36.7  C) Oral 95 16 93 %   12/05/23 0518 (!) 149/99 97.4  F (36.3  C) Oral 86 16 98 %   12/05/23 0100 138/84 -- -- 88 16 97 %   12/04/23 1942 (!) 163/94 98.7  F (37.1  C) Oral 76 18 98 %   12/04/23 1853 131/81 -- -- 86 -- --       Will plan to round on patient in AM.       James Self DO, MS  [facility]  Gynecology Oncology Resident, PGY-3  12/05/2023 6:01 PM    To reach the [facility] GYN ONC team for this patient, please page [phone]    To reach GYN ONC Resident Team OVERNIGHT (6PM-6AM) or WEEKEND (FRI 6PM to SUN 6PM) please page [phone] (off-site [facility], cross cover resident). Please include FULL 10 DIGIT callback # in pages.

## 2023-12-05 NOTE — PLAN OF CARE
Up SBA in room and halls. Tolerating PO intake with copious amounts of fluids. Voiding, small mucus BM. 2L NC chronically. Abdominal incsions CDI, binder on. LR@100.

## 2023-12-05 NOTE — PROGRESS NOTES
"   12/05/23 1200   Appointment Info   Signing Clinician's Name / Credentials (OT) Apolonia Healy,OTR/L   Rehab Comments (OT) eval only today, unable to initiate treatment   Living Environment   People in Home alone   Current Living Arrangements apartment   Home Accessibility no concerns   Transportation Anticipated   (Pt does not drive, PCA drives her whenever she leaves the house)   Living Environment Comments Pt. reports she uses a walk-in shower w/ grabb ars + shower chair, non-slip floor--has had a few falls in the bathroom;pt. unsure of toilet height, vanity/sink support available   Self-Care   Usual Activity Tolerance moderate   Current Activity Tolerance poor   Regular Exercise No   Equipment Currently Used at Home cane, straight;grab bar, tub/shower;shower chair;other (see comments);hospital bed  (per chart, pt. also has an electric scooter, hospital bed)   Fall history within last six months yes   Number of times patient has fallen within last six months 3   Activity/Exercise/Self-Care Comment Per pt., indep. w/ ADL's, has some difficulty dressing;PCA is there daily(per pt.) and completes household tasks-cleaning, meal prep, laundry;per pt., PCA is supposed to assist w/ bathing, but pt.often does on her own before she arrives;pt. reports having a few falls in the  bathroom.   Instrumental Activities of Daily Living (IADL)   IADL Comments PCA assist w/ homemaking /housekeeping tasks, laundry, provides transportation and \"sometime\" assists w/ medication mgmt. per pt.;pt.reports she sometimes forgets to take her meds.   General Information   Onset of Illness/Injury or Date of Surgery 12/04/23   Referring Physician James Self MD   Additional Occupational Profile Info/Pertinent History of Current Problem Pt is a 42 yo female who presents for exploratory laparotomy, hysterectomy, right oophorectomy, bilateral salpingectomy, evacuation of pelvic fluid due to ovarian mass. PMH is COPD (without formal " diagnosis), GERD, DM, peripheral neuropathy, paranoid schizophrenia, anxiety, and leukemia for which she has received chemotherapy & radiation, Raynauds syndrome, and TIA in 2015.   Existing Precautions/Restrictions fall;oxygen therapy device and L/min;abdominal  (2L at home, CPAP)   Limitations/Impairments safety/cognitive  (h/o Paranoid schizoprenia, BEKAH, PTSD + TIA)   General Observations and Info pt. lying flat in bed, does not recall PT session earlier;pain rated 8-9/10--informed nursing. Pt. had some difficulty answering baseline/background questions;reports has PCA daily, however, inconsistent responses provided.   Cognitive Status Examination   Orientation Status orientation to person, place and time   Affect/Mental Status (Cognitive) confused;low arousal/lethargic  (mild confusion)   Follows Commands 75-90% accuracy;follows one-step commands   Safety Deficit   (able to respond to safety questions approp.)   Memory Deficit   (STM recall 3/3 words after delay)   Visual Perception   Impact of Vision Impairment on Function (Vision) glasses;no vision issues reported   Sensory   Sensory Comments no numbness/tingling reported   Pain Assessment   Patient Currently in Pain Yes, see Vital Sign flowsheet  (8-9/10 abdomen;pt.reports chronic back and LE pain)   Range of Motion Comprehensive   Comment, General Range of Motion BUE WNL/WFL (supine)   Strength Comprehensive (MMT)   Comment, General Manual Muscle Testing (MMT) Assessment grossly 4/5 BUE;gen.weakness   Coordination   Upper Extremity Coordination No deficits were identified   Bed Mobility   Comment (Bed Mobility) attempted to log roll, unable at this time due to increased pain;able to complete w/PT earlier min. A   Transfers   Transfer Comments unable to assess;min. A X 2 w/ PT earlier   Upper Body Dressing Assessment/Training   [place] Level (Upper Body Dressing) maximum assist (25% patient effort)   Comment, (Upper Body Dressing) per clinical judgment    Lower Body Dressing Assessment/Training   [place] Level (Lower Body Dressing) dependent (less than 25% patient effort)   Grooming Assessment/Training   Comment, (Grooming) declined   Toileting   [place] Level (Toileting) dependent (less than 25% patient effort)   Comment, (Toileting) per clinical judgment   Clinical Impression   Criteria for Skilled Therapeutic Interventions Met (OT) Yes, treatment indicated   OT Diagnosis Decline in ADL performance   OT Problem List-Impairments impacting ADL problems related to;activity tolerance impaired;balance;cognition;flexibility;mobility;strength;pain   Assessment of Occupational Performance 3-5 Performance Deficits   Identified Performance Deficits dressing, bathing, grooming, toileting, transfers, mobility   Planned Therapy Interventions (OT) ADL retraining;cognition;strengthening   Clinical Decision Making Complexity (OT) problem focused assessment/low complexity   Risk & Benefits of therapy have been explained evaluation/treatment results reviewed;care plan/treatment goals reviewed;risks/benefits reviewed;current/potential barriers reviewed;participants voiced agreement with care plan;participants included;patient   Clinical Impression Comments pt. relating she would like to discharge to a TCU   OT Total Evaluation Time   OT Eval, Low Complexity Minutes (34040) 18   OT Goals   Therapy Frequency (OT) Daily   OT Predicted Duration/Target Date for Goal Attainment 12/12/23   OT Goals Hygiene/Grooming;Upper Body Dressing;Lower Body Dressing;Toilet Transfer/Toileting   OT: Hygiene/Grooming while standing;within precautions;independent;modified independent   OT: Upper Body Dressing Independent;Modified independent;within precautions;including set-up/clothing retrieval   OT: Lower Body Dressing Independent;Modified independent;including orthotic;using adaptive equipment;within precautions;including set-up/clothing retrieval   OT: Toilet Transfer/Toileting  Independent;Modified independent;toilet transfer;cleaning and garment management;using adaptive equipment;within precautions   OT Discharge Planning   OT Plan assess EOB/OOB, sitting EOB for g/h, BSC/chiar transfer;monitor cognition  (ab.precs./ab.binder)   OT Discharge Recommendation (DC Rec) home with assist;home with home care occupational therapy;Transitional Care Facility   OT Rationale for DC Rec Currently, pt. significantly below baseline, unable to perform EOB/OOB  due to pain levels;pt. moving w/ CGA-min. A w/ PT;anticipate pt. will need continued skilled OT intervention at discharge, home w/ assist + Home OT to maximize safety/indep. w/ ADL's/mobility versus TCU  pending further eval./progress;pt. related she would like to go to a TCU at discharge, as she feels she will not  be able to manage.   OT Brief overview of current status unable to assess mobility/OOB/ADL's due to pain levels;will assess 12/6 and provide upsdated DC recs.   Total Session Time   Total Session Time (sum of timed and untimed services) 18

## 2023-12-05 NOTE — DISCHARGE SUMMARY
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     Patient Active Problem List   Diagnosis    Preop testing     Procedures:   - Pelvic exam under anesthesia, exploratory laparotomy, hysterectomy, right oophorectomy, bilateral salpingectomy, evacuation of pelvic fluid    Prior to Admission Medications:  Medications Prior to Admission   Medication Sig Dispense Refill Last Dose    albuterol (PROAIR HFA/PROVENTIL HFA/VENTOLIN HFA) 108 (90 Base) MCG/ACT inhaler Inhale 1-2 puffs into the lungs every 6 hours as needed   12/3/2023 at AM    amitriptyline (ELAVIL) 50 MG tablet Take 50 mg by mouth at bedtime   12/3/2023 at PM    ARIPiprazole (ABILIFY) 15 MG tablet Take 1 tablet by mouth every morning   12/3/2023 at AM    cariprazine (VRAYLAR) 3 MG capsule Take 1 capsule by mouth every morning   12/3/2023 at AM    celecoxib (CELEBREX) 100 MG capsule Take 2 capsules by mouth daily   12/3/2023    cetirizine (ZYRTEC) 10 MG tablet Take 10 mg by mouth daily   12/3/2023 at AM    DULoxetine (CYMBALTA) 60 MG capsule Take 2 capsules by mouth every morning   12/3/2023 at AM    fluticasone (FLONASE) 50 MCG/ACT nasal spray Spray 2 sprays into both nostrils 2 times daily   12/3/2023 at PRN    fluticasone (FLOVENT HFA) 110 MCG/ACT inhaler Inhale 2 puffs into the lungs 2 times daily   12/3/2023 at AM    hydrochlorothiazide (MICROZIDE) 12.5 MG capsule Take 12.5 mg by mouth every morning    12/3/2023 at AM    ipratropium - albuterol 0.5 mg/2.5 mg/3 mL (DUONEB) 0.5-2.5 (3) MG/3ML neb solution Take 1 vial by nebulization every 6 hours as needed for shortness of breath, wheezing or cough   12/4/2023 at AM    omeprazole (PRILOSEC) 20 MG DR capsule Take 2 capsules by mouth daily   12/3/2023 at AM       Discharge Medications:     Review of your medicines         Important    This medication list is not yet final, because your doctor or pharmacist is still double-checking some of the changes. Ask your nurse for an updated version.  Specifically ask about this and similar medications: oxyCODONE (ROXICODONE) 5 MG tablet       START taking        Dose / Directions   acetaminophen 325 MG tablet  Commonly known as: TYLENOL  Used for: S/P laparotomy      Dose: 650 mg  Take 2 tablets (650 mg) by mouth every 6 hours as needed for mild pain  Quantity: 24 tablet  Refills: 0     ibuprofen 600 MG tablet  Commonly known as: ADVIL/MOTRIN  Used for: S/P laparotomy      Dose: 600 mg  Take 1 tablet (600 mg) by mouth every 6 hours as needed for other (mild and/or inflammatory pain.)  Quantity: 12 tablet  Refills: 0     oxyCODONE 5 MG tablet  Commonly known as: ROXICODONE  Used for: S/P laparotomy      Dose: 5 mg  Take 1 tablet (5 mg) by mouth every 6 hours as needed for moderate to severe pain  Quantity: 10 tablet  Refills: 0     senna-docusate 8.6-50 MG tablet  Commonly known as: SENOKOT-S/PERICOLACE  Used for: S/P laparotomy      Dose: 1-2 tablet  Take 1-2 tablets by mouth 2 times daily  Quantity: 30 tablet  Refills: 0            CONTINUE these medicines which have NOT CHANGED        Dose / Directions   albuterol 108 (90 Base) MCG/ACT inhaler  Commonly known as: PROAIR HFA/PROVENTIL HFA/VENTOLIN HFA      Dose: 1-2 puff  Inhale 1-2 puffs into the lungs every 6 hours as needed  Refills: 0     amitriptyline 50 MG tablet  Commonly known as: ELAVIL      Dose: 50 mg  Take 50 mg by mouth at bedtime  Refills: 0     ARIPiprazole 15  MG tablet  Commonly known as: ABILIFY      Dose: 1 tablet  Take 1 tablet by mouth every morning  Refills: 0     celecoxib 100 MG capsule  Commonly known as: celeBREX      Dose: 2 capsule  Take 2 capsules by mouth daily  Refills: 0     cetirizine 10 MG tablet  Commonly known as: zyrTEC      Dose: 10 mg  Take 10 mg by mouth daily  Refills: 0     DULoxetine 60 MG capsule  Commonly known as: CYMBALTA      Dose: 2 capsule  Take 2 capsules by mouth every morning  Refills: 0     fluticasone 110 MCG/ACT inhaler  Commonly known as: FLOVENT HFA      Dose: 2 puff  Inhale 2 puffs into the lungs 2 times daily  Refills: 0     fluticasone 50 MCG/ACT nasal spray  Commonly known as: FLONASE      Dose: 2 spray  Spray 2 sprays into both nostrils 2 times daily  Refills: 0     hydrochlorothiazide 12.5 MG capsule  Commonly known as: MICROZIDE      Dose: 12.5 mg  Take 12.5 mg by mouth every morning  Refills: 0     ipratropium - albuterol 0.5 mg/2.5 mg/3 mL 0.5-2.5 (3) MG/3ML neb solution  Commonly known as: DUONEB      Dose: 1 vial  Take 1 vial by nebulization every 6 hours as needed for shortness of breath, wheezing or cough  Refills: 0     omeprazole 20 MG DR capsule  Commonly known as: PriLOSEC      Dose: 2 capsule  Take 2 capsules by mouth daily  Refills: 0     Vraylar 3 MG capsule  Generic drug: cariprazine      Dose: 1 capsule  Take 1 capsule by mouth every morning  Refills: 0               Where to get your medicines        These medications were sent to [facility] [address] [address]  [address] [address]      Phone: [phone]   acetaminophen 325 MG tablet  ibuprofen 600 MG tablet  senna-docusate 8.6-50 MG tablet       Information about where to get these medications is not yet available    Ask your nurse or doctor about these medications  oxyCODONE 5 MG tablet       Consultations:  - PT    - OT    - Social work       Brief History of Illness:  Tonya Mendoza is a  43 year old who presented to hospital for scheduled procedure due to 23cm right ovarian mass, and abnormal uterine bleeding.     Hospital Course:  Dz:   - Preoperative diagnosis was Pelvic mass, abnormal uterine bleeding.  Frozen section at the time of the surgery showed mucinous cystadenoma.  Final pathology is pending at the time of discharge.  She will follow-up postoperatively for a care plan.  FEN:   -She was maintained on IVF until POD#1, when her diet was slowly advanced.  By discharge, she was tolerating a regular diet without nausea and vomiting and able to maintain her hydration without IVF supplementation.  Pain:   - Her pain was initially controlled on IV pain medications.  Once tolerating PO pain meds, she was transitioned to a PO pain regimen.  Her pain was well controlled on this and she was discharged home with these medications.  CV:   -  Patient has a past history of hypertension. Her PTA hydrochlorothiazide was held while in patient.  Her vital signs were  stable while in house and she had no acute CV issues.  PULM:   - Patient has a past history of Asthma, and COPD with recent exacerbation and PNA, resolved by time of admission. PTA Flovent, albuterol, duoneb, cetrazine, and Flonase were given prn while in patient. She is on chronic 2L O2 at home this was maintained while in house. She also has a history of QUEENIE, uses CPAP at night. Patient has history of TUD, nicotine replacement was available for her while in patient.  By discharge, her O2 sats were greater than 94% on RA with chronic 2L O2. She was encouraged to use her bedside IS while in house.  She had no acute pulmonary issues while in house.  HEME:   - She had an appropriate drop in her hemoglobin after surgery and it was stable at the time of discharge. She has a history of leukemia and known chronic leukocytosis which was stable while in patient.   She had no acute heme issues while in house.  GI:   - She was made NPO prior to the  procedure.  On POD#0, her diet was advanced slowly as tolerated.  At the time of discharge, she was tolerating a regular diet without nausea and vomiting.  She was passing flatus and had a bowel movement prior to discharge. She will be discharged with a bowel regimen to prevent constipation in the postoperative period.  She had no acute GI issues while in house.  :    - A arredondo catheter was placed at the time of the surgery.  Once ambulating unassisted, the arredondo catheter was removed.  Prior to discharge, the patient was voiding spontaneously without difficulty.  She had no acute  issues while in house.  ID:   - The patient was AF during her hospitalization.  She received standard preoperative antibiotics without incident.    ENDO:   - No issues  PSYCH/NEURO:   - Pateint has a past history of paranoid schizophrenia, anxiety, and PTSD. Her PTA Abilify, Cymbalta, Cariprazine, and amitriptyline were given while in patient. No other acute issues.   MSK:   - Patient has a history of chronic back and ankle pain for which she takes Celebrex, this was held while in patient. She had no other musculoskeletal issues while in patient. She has follow up with outpatient PT upon discharge home.   PPX:    -  She was given SCDs, IS, and lovenox during her hospital course.  She tolerated these prophylactic interventions without incident.  They were discontinued at the time of her discharge.      Discharge Instructions and Follow up:  Ms. Tonya Mendoza was discharged from the hospital with follow up with Dr. Cassidy on 12/15/23.     Discharge Diet: Regular  Discharge Activity: Lifting restricted to 15 pounds, no driving while on narcotics, nothing per vagina for 6 weeks.    Discharge Disposition:  Discharged to home in care of PCA    Discharge Staff: Dr. Chas Self DO, MS  [facility]  Gynecology Oncology Resident, PGY-3  12/06/2023 11:46 AM    Vesna Doherty MD  Gynecologic  Oncology  [facility]      To reach the [facility] GYN ONC team for this patient, please page [phone]    To reach GYN ONC Resident Team OVERNIGHT (6PM-6AM) or WEEKEND (FRI 6PM to SUN 6PM) please page [phone] (off-site [facility], cross cover resident). Please include FULL 10 DIGIT callback # in pages.

## 2023-12-05 NOTE — PROGRESS NOTES
"Gynecology Oncology Progress Note  12/05/2023    24 hour events:   - surgery    Subjective: Patient is doing okay this morning. Was able to sleep 'on and off' overnight. Continues to work on pain control, having quite a bit of incisional pain, worse with attempted movement. She did not have anything to eat overnight, planning on attempting breakfast this morning. Denies any nasuea or vomiting. She has not yet ambulated 2/2 to pain. Not yet passing flatus. Denies chest pain, SOB, fevers/chills, dizziness.    Objective:   Patient Vitals for the past 24 hrs:   BP Temp Temp src Pulse Resp SpO2 Height Weight   12/05/23 0518 (!) 149/99 97.4  F (36.3  C) Oral 86 16 98 % -- --   12/05/23 0100 138/84 -- -- 88 16 97 % -- --   12/04/23 1942 (!) 163/94 98.7  F (37.1  C) Oral 76 18 98 % -- --   12/04/23 1853 131/81 -- -- 86 -- -- -- --   12/04/23 1800 (!) 162/96 -- -- 86 -- -- -- --   12/04/23 1730 (!) 155/90 -- -- 77 15 97 % -- --   12/04/23 1552 (!) 153/91 98.6  F (37  C) Oral 73 24 97 % -- --   12/04/23 1500 (!) 163/96 98.2  F (36.8  C) Temporal 82 28 95 % -- --   12/04/23 1450 -- -- -- 81 30 95 % -- --   12/04/23 1444 (!) 177/97 -- -- 84 17 94 % -- --   12/04/23 1440 (!) 177/97 -- -- 87 14 98 % -- --   12/04/23 1430 (!) 168/94 -- -- 85 28 96 % -- --   12/04/23 1420 (!) 159/98 98.2  F (36.8  C) Temporal 80 21 96 % -- --   12/04/23 1410 (!) 169/91 -- -- 83 (!) 38 96 % -- --   12/04/23 1400 (!) 179/103 -- -- 81 (!) 47 98 % -- --   12/04/23 1350 (!) 178/96 -- -- 81 (!) 45 98 % -- --   12/04/23 0820 120/77 97.5  F (36.4  C) Temporal 92 17 98 % 1.499 m (4' 11\") 128.4 kg (283 lb)       General: NAD, appears comfortable in bed  CV: Regular rate, appears well perfused.   Resp: Non-labored breathing, nasal canula in place 2L O2  Abdomen: soft, appropriate tender, non distended  Incision: overlying dressing with small amount of shadowing to left corner. Dressing inact.  Extremities: warm, well-perfused, nontender, 1+ edema, SCDs in " place    I/Os  (Yesterday // Since Midnight)  PO 1620cc // NR  IVF 3543cc // NR  Urine 1850cc // NR  Blood 300cc (surgical)    New Labs/Imaging-   Results for orders placed or performed during the hospital encounter of 12/04/23 (from the past 24 hour(s))   HCG qualitative   Result Value Ref Range    hCG Serum Qualitative Negative Negative   Potassium   Result Value Ref Range    Potassium 3.6 3.4 - 5.3 mmol/L   Glucose   Result Value Ref Range    Glucose 111 (H) 70 - 99 mg/dL   ABO/Rh type and screen    Narrative    The following orders were created for panel order ABO/Rh type and screen.  Procedure                               Abnormality         Status                     ---------                               -----------         ------                     Adult Type and Screen[[id number]]                            Final result                 Please view results for these tests on the individual orders.   Adult Type and Screen   Result Value Ref Range    ABO/RH(D) O POS     Antibody Screen Negative Negative    SPECIMEN EXPIRATION DATE [id number]    Glucose by meter   Result Value Ref Range    GLUCOSE BY METER POCT 121 (H) 70 - 99 mg/dL       Assessment: 43 year old POD#1 s/p XL, MELECIO, RSO, LS for large pelvic mass arising from right ovary. Doing well in early postoperative period.       Disease: 23cm R ovarian mass, frozen: benign       # Postoperative cares:   - FEN: ADAT, Reg diet, LR 100cc/hr  - Pain: Barb Tylenol, Ibuprofen, Oxycodone, IV Dilaudid prn  - Heme: Hgb 11.7> >10.4. No signs or symptoms of acute blood loss anemia  - GI: Barb bowel reg, prn antiemetics   - :  arredondo in place, remove later this morning.   - ID: S/p pre-op abx   - Consults: orders placed for PT/OT and SW consults for assistance with evaluation for appropriate discharge planning.      # H/o COPD, asthma   # QUEENIE  - Baseline O2 requirement of 2L, continue while in patient  - Continue CPAP at night  - Continue PTA flovent,  albuterol, duoneb, certazine, flonase.     Chronic/Stable Conditions:  # HTN - PTA hydrochlorothiazide held, restart prn  # H/o Leukemia - chronic leukocytosis, baseline 14  # TUD - nicotine replacement prn  # GERD - PTA prilosec ord'd    # Paranoid schizoprenia, BEKAH, PTSD - Continue PTA Abilify, Cymbalta, Cariprazine, amitriptyline   # H/o TIA   # Chronic back/ankle pain - PTA Celebrex held while in patient     PPX: SCDs, IS  Drains/Lines: PIV x2, arredondo     Dispo: Discharge to home vs rehab when meeting goals    Patient seen with Dr. Pastora Self DO, MS  [facility]  Gynecology Oncology Resident, PGY-3  12/05/2023 8:20 AM    To reach the [facility] GYN ONC team for this patient, please page [phone]    To reach GYN ONC Resident Team OVERNIGHT (6PM-6AM) or WEEKEND (FRI 6PM to SUN 6PM) please page [phone] (off-site [facility], cross cover resident). Please include FULL 10 DIGIT callback # in pages.     Provider Disclosure:   I agree with above History, Review of Systems, Physical exam and Plan. I have reviewed the content of the documentation and have edited it as needed. I have personally performed the services documented here and the documentation accurately represents those services and the decisions I have made.     Electronically signed by:   Destiny Guillory MD   Gynecologic Oncology   [facility]

## 2023-12-05 NOTE — PLAN OF CARE
Goal Outcome Evaluation:      Plan of Care Reviewed With: patient    Overall Patient Progress: improvingOverall Patient Progress: improving    POD 1 exploratory laparotomy, hysterectomy, right oophorectomy, bilateral salpingectomy, evacuation of pelvic fluid. A&Ox4. VSS on 2L NC. Capno in place overnight. Pain managed with PRN IV dilaudid x2 and schedule tylenol. Nausea episode, Zofran given with relief. Abd midline incision dressing CDI. Ice packs applied. Hypo BS. Welch in placed with adequate UOP overnight and removed this morning. No vaginal bleeding noted. Tolerating ice chips, water, and some pudding this morning. Pt sat at the edge of a bed, refused for ambulates this morning. PIV infusing LR at 100ml/hr. OT/PT, CM/SW consulted. Gyn/onc following. Discharge pending.

## 2023-12-05 NOTE — PROGRESS NOTES
"   12/05/23 0935   Appointment Info   Signing Clinician's Name / Credentials (PT) SIMON Hill   Student Supervision Direct supervision provided;Line of sight supervision provided;Direct Patient Contact Provided;Therapy services provided with the co-signing licensed therapist guiding and directing the services, and providing the skilled judgement and assessment throughout the session   Rehab Comments (PT) 2 TA, eval   Living Environment   People in Home alone   Current Living Arrangements apartment   Home Accessibility no concerns   Transportation Anticipated   (Pt does not drive, PCA drives her whenever she leaves the house)   Self-Care   Usual Activity Tolerance moderate   Current Activity Tolerance fair   Regular Exercise No   Equipment Currently Used at Home cane, straight;grab bar, toilet;grab bar, tub/shower;shower chair  (per pt, she uses a \"mobility chair\" as well.)   Fall history within last six months no   Activity/Exercise/Self-Care Comment Pt has PCA that helps her with tasks around the house, unclear which tasks are performed or how often PCA is at house to help.   General Information   Onset of Illness/Injury or Date of Surgery 12/04/23   Referring Physician Concha Cassidy MD   Patient/Family Therapy Goals Statement (PT) Return home   Pertinent History of Current Problem (include personal factors and/or comorbidities that impact the POC) Pt is a 42 yo female who presents for exploratory laparotomy, hysterectomy, right oophorectomy, bilateral salpingectomy, evacuation of pelvic fluid due to ovarian mass. PMH is COPD (without formal diagnosis), GERD, DM, peripheral neuropathy, paranoid schizophrenia, anxiety, and leukemia for which she has received chemotherapy & radiation, Raynauds syndrome, and TIA in 2015.   Existing Precautions/Restrictions abdominal;fall;oxygen therapy device and L/min  (2 LPM)   Cognition   Affect/Mental Status (Cognition) confused;low arousal/lethargic;emotionally " labile   Orientation Status (Cognition) person;place;time;situation;verbal cues/prompts needed for orientation   Follows Commands (Cognition) follows one-step commands;50-74% accuracy;follows two-step commands;repetition of directions required   Behavioral Issues overwhelmed easily   Cognitive Status Comments Pt required repetition or rewording of explanation of tasks.   Pain Assessment   Patient Currently in Pain Yes, see Vital Sign flowsheet  (Abdominal)   Integumentary/Edema   Integumentary/Edema Comments Pt does have abdominal incision, not visualized.   Posture    Posture Forward head position;Protracted shoulders   Range of Motion (ROM)   Range of Motion ROM is WFL;ROM deficits secondary to pain   ROM Comment Grossly WFL for B UE and LE   Strength (Manual Muscle Testing)   Strength Comments Pt exhibits grossly antigravity strength during functional activities. Decreased activity tolerance noted   Bed Mobility   Comment, (Bed Mobility) Pt able to perform log roll to L with min Ax1 with HHA.   Transfers   Comment, (Transfers) Pt performed STS to FWW with min Ax2.   Gait/Stairs (Locomotion)   Comment, (Gait/Stairs) Pt able to take foward steps with CGAx2.   Balance   Balance Comments Pt able to perform static sitting and static balance with CGA.   Clinical Impression   Criteria for Skilled Therapeutic Intervention Yes, treatment indicated   PT Diagnosis (PT) Impaired gait   Influenced by the following impairments Decreased activity tolerance, pain   Functional limitations due to impairments Decreased independence with functional mobiltiy   Clinical Presentation (PT Evaluation Complexity) stable   Clinical Presentation Rationale clinical judgement   Clinical Decision Making (Complexity) low complexity   Planned Therapy Interventions (PT) balance training;bed mobility training;gait training;motor coordination training;neuromuscular re-education;patient/family education;postural re-education;ROM (range of  motion);strengthening;stretching;transfer training   Risk & Benefits of therapy have been explained evaluation/treatment results reviewed;care plan/treatment goals reviewed;risks/benefits reviewed;current/potential barriers reviewed;patient;participants voiced agreement with care plan   PT Total Evaluation Time   PT Eval, Low Complexity Minutes (79030) 10   Physical Therapy Goals   PT Frequency Daily   PT Predicted Duration/Target Date for Goal Attainment 12/09/23   PT Goals Bed Mobility;Transfers;Gait;PT Goal 1   PT: Bed Mobility Independent;Rolling;Bridging   PT: Transfers Modified independent;Sit to/from stand;Assistive device;Bed to/from chair   PT: Gait Modified independent;Standard walker;50 feet   PT: Goal 1 Pt will be able to perform and verbalize when to use deep breathing techinique in order to decrease pain during transfers.   Interventions   Interventions Quick Adds Therapeutic Activity;Therapeutic Procedure   Therapeutic Procedure/Exercise   Treatment Detail/Skilled Intervention Educated pt on importance of performing ankle pumps and knee extension in seated in order to improve circulation.   Therapeutic Activity   Therapeutic Activities: dynamic activities to improve functional performance Minutes (08948) 26   Symptoms Noted During/After Treatment Increased pain   Treatment Detail/Skilled Intervention Greeted pt in supine in bed. Pt on 2LPM of O2 (pt's baseline per RN.) Additional time required for equipment retrieval and room set-up. Abdominal binder placed around pt to promote improved pain with activity. Pt was very groggy upon arrival and required encouraging pt to wake up multiple times. Upon position changes, pt required additional time for pain to decrease. Pt able to perform bedside ambulation with CGA x2 with FWW approx 24 ft in total. Pt required encouragement to continue breathing to assist with reducing pain. Pt exhibited decreased step length and gait speed. Pt performed stand<>sit to  valdemar with CGAx1, verbal cueing for sequencing and hand placement. Pt required max A for pericares due to abdominal binder restriction of forward flexion of spine. Pt able to perform side stepping and hip adduction in order to assist with pericares. Pt performed stand<>sit x2 to recliner with CGAx1, cueing for hand placement and controlled sitting. Educated pt on procudure and plan for discharge. At end of session, pt in recliner, all needs in reach, chair alarm on, RN updated.   PT Discharge Planning   PT Plan Continue to promote independence with ambulation and transfers.   PT Discharge Recommendation (DC Rec) home with assist   PT Rationale for DC Rec Pt is below functional mobility baseline secondary to incisional pain and decreased activity tolerance. Pt exhibits good functional strength and balance and is able to perform transfers and ambulation with CGAx1. At baseline, pt has a PCA for out of home tasks as well as helping around the home. Anticipate pt able to DC home with assist for higher level tasks. Will continue to update as appropriate   PT Brief overview of current status CGA for transfers and ambulation, min A for bed mob   PT Equipment Needed at Discharge walker, standard   Total Session Time   Timed Code Treatment Minutes 26   Total Session Time (sum of timed and untimed services) 36

## 2023-12-06 ENCOUNTER — APPOINTMENT (OUTPATIENT)
Dept: PHYSICAL THERAPY | Facility: CLINIC | Age: 43
DRG: 742 | End: 2023-12-06
Attending: OBSTETRICS & GYNECOLOGY
Payer: COMMERCIAL

## 2023-12-06 VITALS
OXYGEN SATURATION: 94 % | BODY MASS INDEX: 54.47 KG/M2 | DIASTOLIC BLOOD PRESSURE: 102 MMHG | HEIGHT: 59 IN | WEIGHT: 270.2 LBS | SYSTOLIC BLOOD PRESSURE: 148 MMHG | RESPIRATION RATE: 20 BRPM | HEART RATE: 106 BPM | TEMPERATURE: 97.8 F

## 2023-12-06 LAB
ANION GAP SERPL CALCULATED.3IONS-SCNC: 7 MMOL/L (ref 7–15)
BUN SERPL-MCNC: 3.1 MG/DL (ref 6–20)
CALCIUM SERPL-MCNC: 8.7 MG/DL (ref 8.6–10)
CHLORIDE SERPL-SCNC: 100 MMOL/L (ref 98–107)
CREAT SERPL-MCNC: 0.56 MG/DL (ref 0.51–0.95)
DEPRECATED HCO3 PLAS-SCNC: 29 MMOL/L (ref 22–29)
EGFRCR SERPLBLD CKD-EPI 2021: >90 ML/MIN/1.73M2
ERYTHROCYTE [DISTWIDTH] IN BLOOD BY AUTOMATED COUNT: 14.6 % (ref 10–15)
GLUCOSE SERPL-MCNC: 103 MG/DL (ref 70–99)
GLUCOSE SERPL-MCNC: 103 MG/DL (ref 70–99)
HCT VFR BLD AUTO: 31.2 % (ref 35–47)
HGB BLD-MCNC: 9.6 G/DL (ref 11.7–15.7)
MCH RBC QN AUTO: 28.5 PG (ref 26.5–33)
MCHC RBC AUTO-ENTMCNC: 30.8 G/DL (ref 31.5–36.5)
MCV RBC AUTO: 93 FL (ref 78–100)
PATH REPORT.COMMENTS IMP SPEC: NORMAL
PATH REPORT.FINAL DX SPEC: NORMAL
PATH REPORT.FINAL DX SPEC: NORMAL
PATH REPORT.GROSS SPEC: NORMAL
PATH REPORT.GROSS SPEC: NORMAL
PATH REPORT.INTRAOP OBS SPEC DOC: NORMAL
PATH REPORT.MICROSCOPIC SPEC OTHER STN: NORMAL
PATH REPORT.MICROSCOPIC SPEC OTHER STN: NORMAL
PATH REPORT.RELEVANT HX SPEC: NORMAL
PATH REPORT.RELEVANT HX SPEC: NORMAL
PHOTO IMAGE: NORMAL
PLATELET # BLD AUTO: 347 10E3/UL (ref 150–450)
POTASSIUM SERPL-SCNC: 3.9 MMOL/L (ref 3.4–5.3)
RBC # BLD AUTO: 3.37 10E6/UL (ref 3.8–5.2)
SODIUM SERPL-SCNC: 136 MMOL/L (ref 135–145)
WBC # BLD AUTO: 8.5 10E3/UL (ref 4–11)

## 2023-12-06 PROCEDURE — 90686 IIV4 VACC NO PRSV 0.5 ML IM: CPT | Performed by: OBSTETRICS & GYNECOLOGY

## 2023-12-06 PROCEDURE — 36415 COLL VENOUS BLD VENIPUNCTURE: CPT | Performed by: OBSTETRICS & GYNECOLOGY

## 2023-12-06 PROCEDURE — 85027 COMPLETE CBC AUTOMATED: CPT | Performed by: OBSTETRICS & GYNECOLOGY

## 2023-12-06 PROCEDURE — 250N000011 HC RX IP 250 OP 636: Performed by: OBSTETRICS & GYNECOLOGY

## 2023-12-06 PROCEDURE — 80048 BASIC METABOLIC PNL TOTAL CA: CPT | Performed by: OBSTETRICS & GYNECOLOGY

## 2023-12-06 PROCEDURE — 99024 POSTOP FOLLOW-UP VISIT: CPT | Mod: GC | Performed by: OBSTETRICS & GYNECOLOGY

## 2023-12-06 PROCEDURE — 97530 THERAPEUTIC ACTIVITIES: CPT | Mod: GP | Performed by: PHYSICAL THERAPIST

## 2023-12-06 PROCEDURE — G0008 ADMIN INFLUENZA VIRUS VAC: HCPCS | Performed by: OBSTETRICS & GYNECOLOGY

## 2023-12-06 PROCEDURE — 250N000013 HC RX MED GY IP 250 OP 250 PS 637: Performed by: OBSTETRICS & GYNECOLOGY

## 2023-12-06 PROCEDURE — 88112 CYTOPATH CELL ENHANCE TECH: CPT | Mod: 26 | Performed by: PATHOLOGY

## 2023-12-06 PROCEDURE — 88305 TISSUE EXAM BY PATHOLOGIST: CPT | Mod: 26 | Performed by: PATHOLOGY

## 2023-12-06 RX ORDER — IBUPROFEN 600 MG/1
600 TABLET, FILM COATED ORAL EVERY 6 HOURS PRN
Qty: 12 TABLET | Refills: 0 | Status: SHIPPED | OUTPATIENT
Start: 2023-12-06

## 2023-12-06 RX ORDER — ACETAMINOPHEN 325 MG/1
650 TABLET ORAL EVERY 6 HOURS PRN
Qty: 24 TABLET | Refills: 0 | Status: SHIPPED | OUTPATIENT
Start: 2023-12-06

## 2023-12-06 RX ORDER — OXYCODONE HYDROCHLORIDE 5 MG/1
5 TABLET ORAL EVERY 6 HOURS PRN
Qty: 10 TABLET | Refills: 0 | Status: CANCELLED | OUTPATIENT
Start: 2023-12-06

## 2023-12-06 RX ORDER — LIDOCAINE 4 G/G
1 PATCH TOPICAL
Status: DISCONTINUED | OUTPATIENT
Start: 2023-12-06 | End: 2023-12-06 | Stop reason: HOSPADM

## 2023-12-06 RX ORDER — AMOXICILLIN 250 MG
1-2 CAPSULE ORAL 2 TIMES DAILY
Qty: 30 TABLET | Refills: 0 | Status: SHIPPED | OUTPATIENT
Start: 2023-12-06

## 2023-12-06 RX ADMIN — FAMOTIDINE 20 MG: 20 TABLET, FILM COATED ORAL at 10:20

## 2023-12-06 RX ADMIN — ARIPIPRAZOLE 15 MG: 5 TABLET ORAL at 10:19

## 2023-12-06 RX ADMIN — OXYCODONE HYDROCHLORIDE 10 MG: 5 TABLET ORAL at 03:11

## 2023-12-06 RX ADMIN — ACETAMINOPHEN 650 MG: 325 TABLET, FILM COATED ORAL at 10:19

## 2023-12-06 RX ADMIN — CETIRIZINE HYDROCHLORIDE 10 MG: 10 TABLET, FILM COATED ORAL at 10:19

## 2023-12-06 RX ADMIN — DULOXETINE HYDROCHLORIDE 120 MG: 60 CAPSULE, DELAYED RELEASE ORAL at 10:19

## 2023-12-06 RX ADMIN — DOCUSATE SODIUM 50 MG AND SENNOSIDES 8.6 MG 1 TABLET: 8.6; 5 TABLET, FILM COATED ORAL at 10:19

## 2023-12-06 RX ADMIN — ACETAMINOPHEN 650 MG: 325 TABLET, FILM COATED ORAL at 03:06

## 2023-12-06 RX ADMIN — INFLUENZA A VIRUS A/VICTORIA/4897/2022 IVR-238 (H1N1) ANTIGEN (FORMALDEHYDE INACTIVATED), INFLUENZA A VIRUS A/DARWIN/9/2021 SAN-010 (H3N2) ANTIGEN (FORMALDEHYDE INACTIVATED), INFLUENZA B VIRUS B/PHUKET/3073/2013 ANTIGEN (FORMALDEHYDE INACTIVATED), AND INFLUENZA B VIRUS B/MICHIGAN/01/2021 ANTIGEN (FORMALDEHYDE INACTIVATED) 0.5 ML: 15; 15; 15; 15 INJECTION, SUSPENSION INTRAMUSCULAR at 11:41

## 2023-12-06 RX ADMIN — PANTOPRAZOLE SODIUM 40 MG: 40 TABLET, DELAYED RELEASE ORAL at 10:19

## 2023-12-06 RX ADMIN — CARIPRAZINE 3 MG: 1.5 CAPSULE, GELATIN COATED ORAL at 10:19

## 2023-12-06 ASSESSMENT — ACTIVITIES OF DAILY LIVING (ADL)
ADLS_ACUITY_SCORE: 24
ADLS_ACUITY_SCORE: 28
ADLS_ACUITY_SCORE: 24

## 2023-12-06 NOTE — CONSULTS
Care Management Initial Consult    General Information  Assessment completed with: Patient,    Type of CM/SW Visit: Initial Assessment    Primary Care Provider verified and updated as needed: Yes   Readmission within the last 30 days:        Reason for Consult: discharge planning  Advance Care Planning:    none in chart        Communication Assessment  Patient's communication style: spoken language (English or Bilingual)    Hearing Difficulty or Deaf: no   Wear Glasses or Blind: yes    Cognitive  Cognitive/Neuro/Behavioral: WDL  Level of Consciousness: alert  Arousal Level: opens eyes spontaneously  Orientation: oriented x 4  Mood/Behavior: calm, cooperative  Best Language: 0 - No aphasia  Speech: clear, spontaneous    Living Environment:   People in home: alone     Current living Arrangements:        Able to return to prior arrangements:         Family/Social Support:  Care provided by: friend  Provides care for:     PCA            Description of Support System: Supportive         Current Resources:   Patient receiving home care services: No     Community Resources: PCA  Equipment currently used at home: cane, straight, grab bar, tub/shower, shower chair, other (see comments), hospital bed (per chart, pt. also has an electric scooter, hospital bed)  Supplies currently used at home:      Employment/Financial:  Employment Status:          Financial Concerns:   [facility] [employer]           Does the patient's insurance plan have a 3 day qualifying hospital stay waiver?  No    Lifestyle & Psychosocial Needs:  Social Determinants of Health     Food Insecurity: Not on file   Depression: Not on file   Housing Stability: Not on file   Tobacco Use: High Risk (12/4/2023)    Patient History     Smoking Tobacco Use: Every Day     Smokeless Tobacco Use: Never     Passive Exposure: Not on file   Financial Resource Strain: Not on file   Alcohol Use: Not on file   Transportation Needs: Not on file   Physical  Activity: Not on file   Interpersonal Safety: Not on file   Stress: Not on file   Social Connections: Not on file       Functional Status:  Prior to admission patient needed assistance: Pt lives alone in  apartment and states she has PCA services daily ( 45 hours weekly)  She utilized transportation though her insurance and the number loaded into her phone.    Mental Health Status:          Chemical Dependency Status:                Values/Beliefs:  Spiritual, Cultural Beliefs, Jehovah's witness Practices, Values that affect care:                 Additional Information:  SW  met with  pt in room. She is sitting on edge of bed and stating that she is calling for a ride to discharge asap. Pt shared that she has PCA services and she has PT scheduled through [facility] and plans to attend OP.  SW discussed TCU and pt states she is going home as soon as she is able to secure a ride. She states that she is appreciative of the care she's had here but is ready to go.  SW reviewed discharge process with pt and she indicates she is discharging home when she has a ride set up.   10:31: SW revisited pt and confirmed discharge to home plan. She is insistent she just wants to return home and she is now dressed and ready to go.  SW confirmed with pt that her PCA is aware of her plan to return home so they can begin service.  Anticipate discharge home today with  transport through [employer] ride program.    ANDRESSA Mcdaniels [facility]  Care Transitions    ANDRESSA Mcdaniels [facility]  Care Transitions  [phone]

## 2023-12-06 NOTE — PLAN OF CARE
Physical Therapy Discharge Summary    Reason for therapy discharge:    Discharged to home with outpatient therapy.    Progress towards therapy goal(s). See goals on Care Plan in [facility] electronic health record for goal details.  Goals partially met.  Barriers to achieving goals:   discharge from facility.    Therapy recommendation(s):    Continued therapy is recommended.  Rationale/Recommendations:  OP PT to continue to progress strengthening, activity tolerance, and independence with functional mobility.

## 2023-12-06 NOTE — PROGRESS NOTES
Gynecology Oncology Progress Note  12/06/2023    24 hour events:   - surgery    Subjective: Patient is doing okay this morning. Did not get much sleep overnight. Continues to complain of incisional pain, particularly with movement - notably having burning pain near superior portion of incision. Ate a small amount (turkey sandwich, yogurt) last night and did not have any nausea or vomiting. Felt like ambulating down the hallway went well yesterday. Denies any dizziness or lightheadedness. Passing flatus. Small bowel movements yesterday. No chest pain, SOB. No other complaints this morning. Thinking about TCU may be best option for her.     Objective:   Patient Vitals for the past 24 hrs:   BP Temp Temp src Pulse Resp SpO2 Weight   12/06/23 0315 -- -- -- -- -- -- 122.6 kg (270 lb 3.2 oz)   12/05/23 2324 (!) 152/93 97.5  F (36.4  C) Axillary 87 18 96 % --   12/05/23 1938 (!) 153/84 98.1  F (36.7  C) Oral 107 20 98 % --   12/05/23 1553 (!) 158/97 97.8  F (36.6  C) Oral 100 16 98 % --   12/05/23 0748 (!) 152/89 98  F (36.7  C) Oral 95 16 93 % --       General: NAD, appears comfortable in bed  CV: Regular rate, appears well perfused.   Resp: Non-labored breathing, nasal canula in place 2L O2  Abdomen: soft, appropriate tender, non distended  Incision: overlying dressing with small amount of shadowing to left corner and small encircled area in middle portion. Dressing inact.   Extremities: warm, well-perfused, nontender, 1+ edema, SCDs in place    I/Os  (Yesterday // Since Midnight)  PO 2320 cc // NR   Urine  6200 ml, unmeasured x7  // NR  Stool unmeasured x2        New Labs/Imaging-   Results for orders placed or performed during the hospital encounter of 12/04/23 (from the past 24 hour(s))   Basic metabolic panel   Result Value Ref Range    Sodium 135 135 - 145 mmol/L    Potassium 3.4 3.4 - 5.3 mmol/L    Chloride 101 98 - 107 mmol/L    Carbon Dioxide (CO2) 28 22 - 29 mmol/L    Anion Gap 6 (L) 7 - 15 mmol/L    Urea  Nitrogen 4.8 (L) 6.0 - 20.0 mg/dL    Creatinine 0.56 0.51 - 0.95 mg/dL    GFR Estimate >90 >60 mL/min/1.73m2    Calcium 8.4 (L) 8.6 - 10.0 mg/dL    Glucose 163 (H) 70 - 99 mg/dL   CBC with platelets   Result Value Ref Range    WBC Count 11.8 (H) 4.0 - 11.0 10e3/uL    RBC Count 3.61 (L) 3.80 - 5.20 10e6/uL    Hemoglobin 10.4 (L) 11.7 - 15.7 g/dL    Hematocrit 32.7 (L) 35.0 - 47.0 %    MCV 91 78 - 100 fL    MCH 28.8 26.5 - 33.0 pg    MCHC 31.8 31.5 - 36.5 g/dL    RDW 14.6 10.0 - 15.0 %    Platelet Count 389 150 - 450 10e3/uL       Assessment: 43 year old POD#2 s/p XL, MELECIO, RSO, LS for large pelvic mass arising from right ovary. Doing well in the post operative period - starting to meet goals for discharge home.      Disease: 23cm R ovarian mass, frozen: benign       # Postoperative cares:   - FEN: Regular diet  - Pain: Barb Tylenol, Ibuprofen, Oxycodone, IV Dilaudid prn  - Heme: asymptomatic acute blood loss anemia  - GI: Barb bowel reg, prn antiemetics   - Consults: SW and PT/OT consults today to assist with safest discharge plan     # H/o COPD, asthma   # QUEENIE  - Baseline O2 requirement of 2L, continue while in patient  - Continue CPAP at night  - Continue PTA flovent, albuterol, duoneb, certazine, flonase.     Chronic/Stable Conditions:  # HTN - PTA hydrochlorothiazide held, restart prn  # H/o Leukemia - chronic leukocytosis, baseline 14, improved from baseline this admission  # TUD - nicotine replacement prn  # GERD - PTA prilosec ord'd    # Paranoid schizoprenia, BEKAH, PTSD - Continue PTA Abilify, Cymbalta, Cariprazine, amitriptyline   # H/o TIA   # Chronic back/ankle pain - PTA Celebrex held while in patient     PPX: SCDs, IS. Lovenox 40mg BID while in patient.     Dispo: Patient s/p PT/OT consults with current recommendations for home with assist vs possible TCU. Planning on reassessing later today. When meeting post operative goals will plan for discharge home vs. Rehab.       Patient seen with Dr. Doherty.      James Self DO, MS  [facility]  Gynecology Oncology Resident, PGY-3  12/06/2023 7:13 AM    I, Toni Doherty MD personally examined and evaluated this patient on 12/06/23.  I discussed the patient with the resident and care team, and agree with the assessment and plan of care as documented in the residents note above.    I personally reviewed vital signs, laboratory values and imaging results.    Pt doing well and meeting post-op goals for discharge, however social situation is somewhat challenging so will have SW, PT/OT assess for safe discharge planning.    Vesna Doherty MD  Gynecologic Oncology  [facility]      To reach the [facility] GYN ONC team for this patient, please page [phone]    To reach GYN ONC Resident Team OVERNIGHT (6PM-6AM) or WEEKEND (FRI 6PM to SUN 6PM) please page [phone] (off-site [facility], cross cover resident). Please include FULL 10 DIGIT callback # in pages.

## 2023-12-06 NOTE — PLAN OF CARE
Date & Time: 12/5-6/23, 0313-5047  Surgery/POD#: POD#2, exploratory laparotomy, hysterectomy, right oophorectomy, bilateral salpingectomy, evacuation of pelvic fluid  Behavior & Aggression: green  Fall Risk: yes  Orientation: AxOx4  ABNL VS/O2: VSS except HTN  ABNL Labs: WBC: 11.8, Hgb: 10.4, hematocrit: 32.7  Pain Management: PRN oxy x2, scheduled tylenol  Bowel/Bladder: continent   Drains: PIV SL   Diet: regular, drinking copious amounts of fluids   Activity Level: SBA/walker  Tests/Procedures:   Anticipated  DC Date: pending   Significant Information:  Gyn/onc following, incision CDI, abdominal binder on, no vaginal bleeding. Lidocaine patch ordered PRN for burning pain near incision

## 2023-12-06 NOTE — PROGRESS NOTES
Patient discharged at 2:12 PM to Discharged to home  IV was discontinued. Pain at time of discharge was 1/10. Belongings returned to patient.  Discharge instructions and medications reviewed with patient.  Awaiting oxycodone prescription from provider but after two phone calls pt was anxious to get home without it being filled. Patient verbalized understanding and all questions were answered.   At time of discharge, patient condition was stable and left the unit gen surg escorted by RN.

## 2023-12-06 NOTE — PROGRESS NOTES
"Brief Progress Note    Called RN to check in on patient     No acute concerns overnight. Had complaint of burning near incision an hour ago, received Oxycodone and an ice pack. Tolerated a box meal with a turkey sandwich, yogurt without n/v. Ambulated down the he without dizziness early in the evening. Voiding spontaneously. Has been having small Bms.No complaints of chest pain or SOB.     BP (!) 152/93 (BP Location: Right arm, Patient Position: Sitting, Cuff Size: Adult Large)   Pulse 87   Temp 97.5  F (36.4  C) (Axillary)   Resp 18   Ht 1.499 m (4' 11\")   Wt 122.6 kg (270 lb 3.2 oz)   LMP 12/04/2023 (Exact Date)   SpO2 96%   BMI 54.57 kg/m       Patient doing well. Possible discharge later today. Lidocaine patch ordered prn for burning pain beside incision.    Cinthya Israel MD  Ob/Gyn Resident, PGY-2  12/6/2023 4:46 AM   "

## 2023-12-06 NOTE — RESULT ENCOUNTER NOTE
I have personally reviewed the pathology results which support a diagnosis of a benign right ovarian mucinous cystadenoma.  Sites involved in this diagnosis include: right ovary.      Concha Cassidy MD, MS, FACOG, FACS  12/6/2023  4:02 PM

## 2023-12-06 NOTE — DISCHARGE INSTRUCTIONS
GENERAL POST-OPERATIVE  PATIENT INSTRUCTIONS      FOLLOW-UP:    Call Surgeon if you have:  Temperature greater than 100.4  Persistent nausea and vomiting  Severe uncontrolled pain  Redness, tenderness, or signs of infection (pain, swelling, redness, odor or green/yellow discharge around the site)  Difficulty breathing, headache or visual disturbances  Hives  Persistent dizziness or light-headedness  Extreme fatigue  Any other questions or concerns you may have after discharge    In an emergency, call 911 or go to an Emergency Department at a nearby hospital       WOUND CARE INSTRUCTIONS:  Keep a dry clean dressing on the wound if there is drainage. If you had a bandage initially, it may be removed after 24 hours.  Once the wound has quit draining you may leave it open to air.  If clothing rubs against the wound or causes irritation and the wound is not draining you may cover it with a dry dressing during the daytime.  Try to keep the wound dry and avoid ointments on the wound unless directed to do so.  If the wound becomes bright red and painful or starts to drain infected material that is not clear, please contact your physician immediately.    1.  You may shower 24 hrs after surgery   2.  No soaking in the tub for 4 weeks       DIET:  There are no dietary restrictions.  You may eat any foods that you can tolerate unless instructed otherwise.  It is a good idea to eat a high fiber diet and take in plenty of fluids to prevent constipation.  If you become constipated, please follow the instructions below.    ACTIVITY:  You are encouraged to cough, deep breath and use your incentive spirometer if you were given one, every 15-30 minutes when awake.  This will help prevent respiratory complications and low grade fevers post-operatively.  You may want to hug a pillow when coughing and sneezing to add additional support to the surgical area, if you had abdominal surgery, which will decrease pain during these times.       1.  No heavy lifting >20lbs or strenuous exercise for six-eight weeks.  No exercise in which you are using core muscles (yoga, pilates, swimming, weight lifting)  2.  You may walk as much as you wish.  You are encouraged to increase your activity each day after surgery.  Stairs are okay.   3.  Nothing per vagina for eight weeks.  No tampons, no intercourse, no douching.  You can expect some light vaginal spotting and discharge for up to six weeks.  If bleeding becomes heavy, please contact the office.     MEDICATIONS:  Try to take narcotic medications and anti-inflammatory medications, such as tylenol, ibuprofen, naprosyn, etc., with food.  This will minimize stomach upset from the medication.  Should you develop nausea and vomiting from the pain medication, or develop a rash, please discontinue the medication and contact your physician.  You should not drive, make important decisions, or operate machinery when taking narcotic pain medication.    OTHER:  Patients are often constipated after general anesthesia and surgery.  The patient should continue to take stool softeners (for example, Senokot-S) for the next six weeks (unless diarrhea develops) and consume adequate amounts of water.  If the patient remains constipated or unable to pass stool, please try one or all of the following measures:  1.  Milk of Magnesia 30cc twice a day as needed by mouth  2.  Metamucil 2 tablespoons in 12 ounces of fluid  3.  Dulcolax oral or suppositories  4.  Prunes or prune juice  5.  Miralax daily      QUESTIONS:  Please feel free to call your physician or the hospital  if you have any questions, and they will be glad to assist you.

## 2023-12-07 DIAGNOSIS — G89.18 ACUTE POST-OPERATIVE PAIN: Primary | ICD-10-CM

## 2023-12-07 RX ORDER — OXYCODONE HYDROCHLORIDE 5 MG/1
5 TABLET ORAL EVERY 8 HOURS PRN
Qty: 12 TABLET | Refills: 0 | Status: SHIPPED | OUTPATIENT
Start: 2023-12-07 | End: 2024-01-16

## 2023-12-07 NOTE — TELEPHONE ENCOUNTER
Tonya calling to check on status of prescription.     This writer informed Tonya will page out provider    0912 Paged Kadie Montgomery APRN    2632 Per Kadie Montgomery usually pt is discharged with short supply of oxycodone to assist with post surgical pain. sEnt to pharmacy.

## 2023-12-07 NOTE — TELEPHONE ENCOUNTER
Narcotic Refill Request    Medication(s) requested:  Oxycodone 5mg  Person Requesting Refill:Tonya (pt)  What pain is the medication treating: abdomen where surgery was done  How is the medication being taken?:1 tablet every 6 to 8hours  Does pt have enough for today? None in home, pt states was suppose to be discharged home wih oxycodone but never received them  Is pain being adequately controlled on the current regimen?: pt is currently is maxing out on Tylenol and IBUprofen but still dealing with post procedure pain  Experiencing any side effects from medication?: Denies    Date of most recent appointment:  12/4/23 procedure with Dr. Cassidy  Any No Show Visits:non recently  Next appointment:   12/15/23  Last fill date and by whom:  took in hospital   Reviewed: not an agent    Best pharmacy [facility] [address]    Send to provider:

## 2023-12-07 NOTE — PLAN OF CARE
Occupational Therapy Discharge Summary    Reason for therapy discharge:    Discharged to home.    Progress towards therapy goal(s). See goals on Care Plan in [facility] electronic health record for goal details.  Goals partially met.  Barriers to achieving goals:   discharge from facility.    Therapy recommendation(s):    Currently, pt. significantly below baseline, unable to perform EOB/OOB due to pain levels;pt. moving w/ CGA-min. A w/ PT;anticipate pt. will need continued skilled OT intervention at discharge, home w/ assist + Home OT to maximize safety/indep. w/ ADL's/mobility versus TCU pending further eval./progress;pt. related she would like to go to a TCU at discharge, as she feels she will not be able to manage.

## 2023-12-08 ENCOUNTER — PATIENT OUTREACH (OUTPATIENT)
Dept: CARE COORDINATION | Facility: CLINIC | Age: 43
End: 2023-12-08
Payer: COMMERCIAL

## 2023-12-08 ENCOUNTER — PATIENT OUTREACH (OUTPATIENT)
Dept: ONCOLOGY | Facility: CLINIC | Age: 43
End: 2023-12-08

## 2023-12-08 NOTE — PROGRESS NOTES
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      Hospital Course:  Dz:   - Preoperative diagnosis was Pelvic mass, abnormal uterine bleeding.  Frozen section at the time of the surgery showed mucinous cystadenoma.  Final pathology is pending at the time of discharge.  She will follow-up postoperatively for a care plan.  FEN:   -She was maintained on IVF until POD#1, when her diet was slowly advanced.  By discharge, she was tolerating a regular diet without nausea and vomiting and able to maintain her hydration without IVF supplementation.  Pain:   - Her pain was initially controlled on IV pain medications.  Once tolerating PO pain meds, she was transitioned to a PO pain regimen.  Her pain was well controlled on this and she was discharged home with these medications.  CV:   -  Patient has a past history of hypertension. Her PTA hydrochlorothiazide was held while in patient.  Her vital signs were  stable while in house and she had no acute CV issues.  PULM:   - Patient has a past history of Asthma, and COPD with recent exacerbation and PNA, resolved by time of admission. PTA Flovent, albuterol, duoneb, cetrazine, and Flonase were given prn while in patient. She is on chronic 2L O2 at home this was maintained while in house. She also has a history of QUEENIE, uses CPAP at night. Patient has history of TUD, nicotine replacement was available for her while in  patient.  By discharge, her O2 sats were greater than 94% on RA with chronic 2L O2. She was encouraged to use her bedside IS while in house.  She had no acute pulmonary issues while in house.  HEME:   - She had an appropriate drop in her hemoglobin after surgery and it was stable at the time of discharge. She has a history of leukemia and known chronic leukocytosis which was stable while in patient.   She had no acute heme issues while in house.  GI:   - She was made NPO prior to the procedure.  On POD#0, her diet was advanced slowly as tolerated.  At the time of discharge, she was tolerating a regular diet without nausea and vomiting.  She was passing flatus and had a bowel movement prior to discharge. She will be discharged with a bowel regimen to prevent constipation in the postoperative period.  She had no acute GI issues while in house.  :    - A arredondo catheter was placed at the time of the surgery.  Once ambulating unassisted, the arredondo catheter was removed.  Prior to discharge, the patient was voiding spontaneously without difficulty.  She had no acute  issues while in house.  ID:   - The patient was AF during her hospitalization.  She received standard preoperative antibiotics without incident.    ENDO:   - No issues  PSYCH/NEURO:   - Pateint has a past history of paranoid schizophrenia, anxiety, and PTSD. Her PTA Abilify, Cymbalta, Cariprazine, and amitriptyline were given while in patient. No other acute issues.   MSK:   - Patient has a history of chronic back and ankle pain for which she takes Celebrex, this was held while in patient. She had no other musculoskeletal issues while in patient. She has follow up with outpatient PT upon discharge home.   PPX:    -  She was given SCDs, IS, and lovenox during her hospital course.  She tolerated these prophylactic interventions without incident.  They were discontinued at the time of her discharge.       ASSESSMENT         Discharge Assessment  How are you  "doing now that you are home?: Patient reports that she is doing better, but she continues to have pain. She has called the triage line for an refill on her pain medications. Patient reported to be on the floor during the call, she states she does not need any assistance getting up at this time. She states that she is cleaning under her bed and \" she will be fine to get up.\" She is planning to to take a shower today. Patient has no other questions or concerns beyond needing her appointment emailed to her for a reminder. SW to email patient her appointment time and date.  How are your symptoms? (Red Flag symptoms escalate to triage hotline per guidelines): Improved  Do you feel your condition is stable enough to be safe at home until your provider visit?: Yes  Does the patient have their discharge instructions? : Yes  Does the patient have questions regarding their discharge instructions? : No  Were you started on any new medications or were there changes to any of your previous medications? : Yes  Does the patient have all of their medications?: Yes  Do you have questions regarding any of your medications? : No  Do you have all of your needed medical supplies or equipment (DME)?  (i.e. oxygen tank, CPAP, cane, etc.): Yes  Discharge follow-up appointment scheduled within 14 calendar days? : Yes  Discharge Follow Up Appointment Date: 12/15/23  Discharge Follow Up Appointment Scheduled with?: Primary Care Provider    Post-op ([facility] CTA Only)  If the patient had a surgery or procedure, do they have any questions for a nurse?: No       PLAN                                                      Outpatient Plan:    Discharged to home in care of PCA   Follow-up and recommended labs and tests  Follow up with Dr. Cassidy 12/15/23    Future Appointments   Date Time Provider Department Center   12/15/2023 11:00 AM Concha Cassidy MD [facility]         For any urgent concerns, please contact our 24 hour nurse triage line: " [phone] ([phone])         ANDRESSA Patricia (she/her/hers)  Social Work Clinic Care Coordinator   [facility]  [email or web address]  [phone]

## 2023-12-14 ENCOUNTER — TELEPHONE (OUTPATIENT)
Dept: ONCOLOGY | Facility: CLINIC | Age: 43
End: 2023-12-14
Payer: COMMERCIAL

## 2023-12-15 NOTE — TELEPHONE ENCOUNTER
824  Tonya davis [birth year]  Discharged from hosp  12/6  surg 12/4  Did not get sent home with pain  med  In [facility], would like rx sent to [facility]  In a lot of pain, difficult to sleep  [phone]

## 2023-12-28 ENCOUNTER — TELEPHONE (OUTPATIENT)
Dept: SURGERY | Facility: CLINIC | Age: 43
End: 2023-12-28
Payer: COMMERCIAL

## 2023-12-28 NOTE — PROGRESS NOTES
Left message for pt and encouraged to call back or contact me via happin! for assistance with smoking cessation

## 2023-12-31 ENCOUNTER — HEALTH MAINTENANCE LETTER (OUTPATIENT)
Age: 43
End: 2023-12-31

## 2024-01-11 ENCOUNTER — NURSE TRIAGE (OUTPATIENT)
Dept: NURSING | Facility: CLINIC | Age: 44
End: 2024-01-11

## 2024-01-11 NOTE — TELEPHONE ENCOUNTER
left message on voice mail for pt daughter to call clinic back      When you burp it tastes bad and smells foul  Smells like sulfur,   Starting yesterday  No nausea/vomiting  No issues eating/drinking  Does have diarrhea started today loose/liquid not watery.  Normal brown color.    Denies fever  Notes pain in belly started yesterday.  Did eat something different yesterday  Advised likely GI bug could be related to food eaten  Push fluids, eat bland diet  Call pcp if persists  Unlikely related to past surgery  No need to see gyn onc sooner than 1/19 appt

## 2024-01-11 NOTE — TELEPHONE ENCOUNTER
Nurse Triage SBAR    Is this a 2nd Level Triage? YES      Family would like to speak with the Nurse Directly from the clinic.    Please call Silva Humphrey back @ [phone] for further assistance.    Okay to leave a direct line number back into the clinic for further assistance.    Tara Kwong RN  Central Triage Red Flags/Med Refills      Protocol Recommended Disposition:   Discuss With PCP And Callback By Nurse Within 1 Hour            Reason for Disposition   Nursing judgment    Additional Information   Negative: New-onset or worsening symptoms, see that protocol (e.g., diarrhea, runny nose, sore throat)   Negative: Medicine question not related to refill or renewal   Negative: Requesting a renewal or refill of a medicine patient is currently taking   Negative: Questions or concerns about high blood pressure   Negative: Nursing judgment    Protocols used: Information Only Call - No Triage-A-OH

## 2024-01-16 DIAGNOSIS — G89.18 ACUTE POST-OPERATIVE PAIN: ICD-10-CM

## 2024-01-16 RX ORDER — OXYCODONE HYDROCHLORIDE 5 MG/1
5 TABLET ORAL EVERY 12 HOURS PRN
Qty: 5 TABLET | Refills: 0 | Status: SHIPPED | OUTPATIENT
Start: 2024-01-16

## 2024-01-16 NOTE — TELEPHONE ENCOUNTER
Tonya is 6 weeks postop, and has not presented for 2 scheduled postop visits. She has a follow-up scheduled again with me on Friday--she needs to present for that appointment, as needing narcotic pain meds 6 weeks after surgery is not typical, and she needs evaluation to rule-out complications. I cannot prescribe any more pain meds without seeing her for an exam.    Concha Cassidy MD, MS, FACOG, FACS  1/16/2024  4:23 PM

## 2024-01-16 NOTE — TELEPHONE ENCOUNTER
"Oncology Nurse Triage - Pain  Situation: Tonya reporting the following symptoms: Pain      Background:   Treating Provider:   Concha Cassidy MD    Date of last office visit: Surgery on 1/4    Recent Treatments: Surgery: 1/4-1/6/24: Pelvic exam under anesthesia, exploratory lap, hysterectomy, R oophorectomy, bilateral salpinectomy, evacuation of pelvic fluid    Assessment: Pt is reporting post operative abdominal pain and is at the pharmacy at the time of the call to get a refill of her oxycodone.    Location: R abdomen and side and hurts into her back \"like strings are being pulled on the inside.\" Feels sharp pain when she uses the bathroom, when has a bowel movement, she feels it gets stuck. If sit up a little bit, pain is a sharp pulling pain on the inside.  Onset: Surgery. States she has been sick so she missed her follow up visits. States that her medical drivers wouldn't take her in because she had respiratory infections which she is prone to because she has asthma and sinus issues.   Duration/Frequency: pain is constant but moves around on R side and travels to the travels to the back. Feels like it is tugging towards her back. Feels like strings pulling from the inside  Rates: 9/10 at worst  Quality: sharp and shoots across  R abd, side and into back  Current med(s) used: has been out of oxycodone x 4 days. Before running out, she was taking one in the a.m. and one in the p.m. before bed but then had to take at 3 p.m. because the pain was so intense.  She was taking Tylenol between doses of oxycodone.   Current tylenol dose is 4 tabs (325 mg/tab) TID for 12 tabs/day.  Ibuprofen--states she can't take ibuprofen because of the leukemia and high BP  Worsens or relieves with: pain medication; applying ice to abd which helps  She describes difficulty passing stool, but then states she goes several times per day. Last BM was today and was normal. No blood in stool. She has been taking Senna 2 capsules BID.  Denies " "bloating or tightness in abd; states it feels kind of swollen at the top part of her abd but states, \"I don't know if it is just that I feel fat.\"   States, \"Belly button still has a hole in it.\" Bottom of incision at umbilicus is still open, and she can put her finger in. There are no stitches. Some clear drainage. Sometimes it looks normal and sometimes looks swollen.   Drinks about 1.5 gallons of clear liquids per day  No problems with urination or pain with urination. Hurts when she can't empty her bladder; urine is clear, she goes more at night, does not feel like she has a UTI or bladder infection.    Denies fevers/chills, cough, sore throat, chest pain, SOB, headache, n/v, bowel & bladder issues,ozzy, new or increased bleeding or worsening fatigue.      Narcotic Refill Request  Person Requesting Refill:oxycodone 5 mg tablet    Medication(s) requested:  Tonya  Last fill date and by whom:  Kadie Montgomery CNP on 12/7/23  What pain is the medication treating: post op pain  How is the medication being taken?:BID, 5 mg/tab  Does pt have enough for today? No. She has been out for 4 days  Is pain being adequately controlled on the current regimen?: NO, would like oxy refill.  Experiencing any side effects from medication?: no    Date of most recent appointment:  hospital discharge on 1/6/24  Any No Show Visits:yes on 12/15 and 12/19/23 **see above for reason given  Next appointment:   1/19/24 with Dr. Cassidy    [facility] Reviewed: no    Explained to pt that this writer is unsure when rx would be reviewed and am not sure it will be filled. She insists she is waiting at the pharmacy for the refill.    Paged provider 1400 Kadie Montgomery CNP  Spoke with Kadie Montgomery CNP who agreed to prescribe 5 oxycodone for pt to use sparingly until she sees Dr. Cassidy on Friday and then can discuss pain management with her.  Pt should use oxycodone sparingly, and only if needed for severe pain.  Pt can take up to three 325mg/tab tablets of " tylenol Q 8H but should not exceed 3000 mg/24 hours. Current amount being taken is too much.  Pt can use ibuprofen as needed 1-2 tabs BID.  Pt should avoid touching incision and/or putting finger in the opening to prevent infection.    Follow-Up:  Spoke with pt and reviewed all recommendations by Kadie. Pt voiced understanding of this information and does plan on seeing Dr. Cassidy on 1/19/24 (this Friday).    Message routed to Care Team.

## 2024-01-18 NOTE — PROGRESS NOTES
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       History of Present Illness:  Tonya Mendoza is a 43 year old patient referred to the gyn onc clinic for evaluation and management of a pelvic mass. Medical history significant for class III morbid obesity, COPD, paranoid schizophrenia and anxiety, and leukemia for which she has received chemotherapy & radiation. History as follows:    22: Pelvic CT to evaluate left inguinal abscess.   PELVIC ORGANS: There is a 5.3 cm right ovarian cysts. Unremarkable left ovary and uterus.   10/3/23: Pelvic ultrasound to evaluate abnormal uterine bleeding. I have personally reviewed and interpreted the ultrasound images.  Predominantly cystic multiseptated mass with some areas that appear more solid.   UTERUS: 6.1 x 1.7 x 3.7 cm. Normal homogeneous echotexture.   ENDOMETRIUM: 7 mm.   RIGHT ADNEXA: 23.2 x 21.5 x 17.9 cm complex cystic lesion.   LEFT OVARY: Not visualized  OTHER: No significant free fluid within the cul-de-sac.    10/13/23: Abdomen, pelvic CT: I have personally reviewed and interpreted the CT images. Largely cystic, multiseptated mass with some areas which appear more solid.   ABDOMEN: BISHNU.  PELVIS: Very large pelvic lesions which appears to be a multiseptated cystic lesion measuring 23 x 17 x 23 cm. Uterus is normal in size.   No lymphadenopathy.   10/17/23: CA-125=16, CEA=3.4, Inhibin B 18.     23: Pelvic exam under anesthesia, exploratory laparotomy, hysterectomy, right oophorectomy, bilateral salpingectomy, evacuation of pelvic  "fluid.   -Pathology: Benign mucinous tumor (mucinous cystadenoma) with focal atypia/epithelial proliferation representing <10% of the neoplasm; weakly proliferative endometrium.      Subjective:  Tonya Mendoza returns to the gyn onc clinic today for a delayed follow-up visit, accompanied by her  Judy.   Tonya reports her recovery has been painful.   Judy reports that last week she had belching that smelled like a bowel movement.This has now resolved.   Tonya reports that when she eats she feels the food sits in her chest, and then when she belches it sometimes comes up. This has not resolved. She reports no flatus. She is having bowel movements 2-3 times per day. No blood in the stool.   Urinating without difficulty.   Tonya reports that she has been taking tylenol for pain 6x/day.She reports that she has pain on the inside, unchanged from the pain she had prior to surgery. She cannot identify how the incision feels--she reports that it itching. Judy reports that the incision looks good.   No chest pain/shortness of breath.   No fevers/chills.   Tonya is extremely tired because she did not sleep well overnight.        Medical History:  Past Medical History:   Diagnosis Date    Abnormal uterine bleeding (AUB)     Anxiety     Arthritis     COPD (chronic obstructive pulmonary disease) (H)     Depressive disorder     GERD (gastroesophageal reflux disease)     HTN (hypertension)     Leukemia (H) 2015    MDD (major depressive disorder)     Morbid obesity with BMI of 50.0-59.9, adult (H)     Paranoid schizophrenia (H)     Pelvic mass     PTSD (post-traumatic stress disorder)     Sleep apnea     TIA (transient ischemic attack) 2015    Uncomplicated asthma          Surgical History:  Past Surgical History:   Procedure Laterality Date    APPENDECTOMY      BREAST BIOPSY, RT/LT Right 1995    Benign    ELBOW SURGERY Left     \"Pins in her left elbow\"    EXAM UNDER ANESTHESIA PELVIC N/A 12/4/2023    " "Procedure: Exam under anesthesia pelvic;  Surgeon: Concha Cassidy MD;  Location:  OR    FOOT SURGERY Right     \"Plate in her foot\"    HYSTERECTOMY TOTAL ABDOMINAL, BILATERAL SALPINGO-OOPHORECTOMY, COMBINED N/A 2023    Procedure: Pelvic exam under anesthesia, exploratory laparotomy, hysterectomy, right oophorectomy, bilateral salpingectomy, evacuation of pelvic fluid;  Surgeon: Concha Cassidy MD;  Location:  OR          Gynecologic History:  Menstrual/Menopause status: Regular monthly periods until age 43y (see HPI).   Hormone therapy/Contraception status: None  History of abnormal cervical cancer screening:  None  History of STIS: None      Obstetric History:  OB History    Para Term  AB Living   0 0 0 0 0 0   SAB IAB Ectopic Multiple Live Births   0 0 0 0 0        Healthcare Maintenance:   Last cervical cancer screening: 10/27/23  Result: NILM, hrHPV-  Last breast cancer screenin22 Result: normal (fibroglandular densities)  Last colon cancer screening: Not indicated: Age <45-50 years   HPV vaccination status: Unvaccinated      Medications:   Current Outpatient Medications   Medication    acetaminophen (TYLENOL) 325 MG tablet    albuterol (PROAIR HFA/PROVENTIL HFA/VENTOLIN HFA) 108 (90 Base) MCG/ACT inhaler    amitriptyline (ELAVIL) 50 MG tablet    ARIPiprazole (ABILIFY) 15 MG tablet    cariprazine (VRAYLAR) 3 MG capsule    celecoxib (CELEBREX) 100 MG capsule    cetirizine (ZYRTEC) 10 MG tablet    DULoxetine (CYMBALTA) 60 MG capsule    fluticasone (FLONASE) 50 MCG/ACT nasal spray    fluticasone (FLOVENT HFA) 110 MCG/ACT inhaler    hydrochlorothiazide (MICROZIDE) 12.5 MG capsule    ibuprofen (ADVIL/MOTRIN) 600 MG tablet    ipratropium - albuterol 0.5 mg/2.5 mg/3 mL (DUONEB) 0.5-2.5 (3) MG/3ML neb solution    omeprazole (PRILOSEC) 20 MG DR capsule    oxyCODONE (ROXICODONE) 5 MG tablet    senna-docusate (SENOKOT-S/PERICOLACE) 8.6-50 MG tablet     No current " "facility-administered medications for this visit.          Allergies:   Allergies   Allergen Reactions    Codeine Nausea and Vomiting and Hives          Social History:  Patient lives independently.   Work status: on Disability. Activity: Cannot walk 1 block without shortness of breath.  Advanced Directives: None Desired Healthcare Power of : Partner/PCA Oscar \"Rohan\" Derek.  Social History     Tobacco Use    Smoking status: Every Day     Packs/day: 0.50     Years: 31.00     Additional pack years: 0.00     Total pack years: 15.50     Types: Cigarettes    Smokeless tobacco: Never    Tobacco comments:     10 Cigarettes daily, trying to quit   Vaping Use    Vaping Use: Never used   Substance Use Topics    Alcohol use: Yes     Comment: occassional    Drug use: Not Currently     Types: Marijuana     Comment: one time a week          Family History:  Family history unknown  Family History   Adopted: Yes   Family history unknown: Yes         Physical Exam:   /67   Pulse 93   Temp 97.9  F (36.6  C)   LMP 12/04/2023 (Exact Date)   SpO2 95%   There is no height or weight on file to calculate BMI.     General Appearance: healthy and alert, no distress. Intermittently falls asleep, but rouses easily to voice, and answers questions appropriately.      Musculoskeletal: extremities without edema    Skin: no lesions or rashes     Neurological: normal gait, no gross defects     Psychiatric: appropriate mood and affect             Abdominal:       abdomen soft, non-tender, non-distended. Vertical midline abdominal incision clean, dry and intact, no erythema, drainage or others signs of infection.         Laboratory Examination:  I have independently reviewed & interpreted the 12/4/23 pathology results detailed in the HPI.         Performance Status:  ECOG Grade 3.      Assessment:  Tonya Mendoza is a 43 year old patient with a diagnosis of a benign mucinous cystadenoma of the right ovary with focal " atypia/epithelial proliferation s/p hysterectomy, bilateral salpingectomy, right oophorectomy.  Recovering well postoperatively.     Medical history significant for class III morbid obesity with BMI 58, COPD, paranoid schizophrenia and anxiety. History of leukemia with chronic leukocytosis.  Surgical history significant for appendectomy due to ruptured appendix.        Plan:   1) Mucinous cystadenoma: I reviewed the pathology results with Tonya and provided her with a copy of the pathology report; also previously provided her with a copy of the pathology report via [employer]. Given the benign results, no additional treatment and no long-term follow-up is indicated, and Tonya is discharged from follow-up in the gyn onc clinic.  She was encouraged to call if she has any questions or concerns related to her postoperative recovery.     2.) Genetic risk factors assessed: Family history unknown    3) Labs/tests ordered: None.    4) Health maintenance issues addressed: Continue routine healthcare maintenance and management of other medical diagnoses with her primary care provider and other specialists.  -Abdominal pain: Pre-exists surgery and unchanged since surgery. Continue follow-up with her PCP for management.   -Tonya no longer needs screening Pap/HPV tests per ASCCP guidelines since her cervix has been removed.     6) Code status: Full-code.    6) Prescriptions: None.        A total of 20 minutes was spent with the patient, 15 minutes of which were spent in counseling/treatment planning, 5 minutes of which were spent in postoperative care/counseling; an additional 5 minutes was spent in chart review (including review of labs) and documentation.         Concha Cassidy MD, MS, FACOG, FACS  1/19/2024  12:17 PM

## 2024-01-18 NOTE — PATIENT INSTRUCTIONS
SCHEDULING:  None.        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  Treatment History:  -12/4/23: Pelvic exam under anesthesia, exploratory laparotomy (make an incision, look inside), hysterectomy (removal of uterus/cervix), right oophorectomy (removal of right ovary), bilateral salpingectomy (removal of bilateral fallopian tubes), evacuation of pelvic fluid.   *NOTE: Left ovary still remains in your pelvis.      PLAN:  1) Cystadenoma: Given the benign results, no additional treatment and no long-term follow-up is needed, and you are discharged from follow-up in the gyn onc clinic.   Please call if you have any questions or concerns related to your postoperative recovery. [phone]     2) Healthcare maintenance: continue routine healthcare maintenance and management of your other health problems with your primary care provider.   -You no longer need screening Pap/HPV tests since your cervix has been removed.       Please call with any questions or concerns. [phone]      Concha Cassidy MD, MS, FACOG, FACS  1/19/2024  12:12 PM

## 2024-01-19 ENCOUNTER — ONCOLOGY VISIT (OUTPATIENT)
Dept: ONCOLOGY | Facility: CLINIC | Age: 44
End: 2024-01-19
Attending: OBSTETRICS & GYNECOLOGY
Payer: COMMERCIAL

## 2024-01-19 VITALS
SYSTOLIC BLOOD PRESSURE: 113 MMHG | HEART RATE: 93 BPM | DIASTOLIC BLOOD PRESSURE: 67 MMHG | OXYGEN SATURATION: 95 % | TEMPERATURE: 97.9 F

## 2024-01-19 DIAGNOSIS — Z98.890 POSTOPERATIVE STATE: Primary | ICD-10-CM

## 2024-01-19 DIAGNOSIS — G89.29 OTHER CHRONIC PAIN: ICD-10-CM

## 2024-01-19 PROCEDURE — 99213 OFFICE O/P EST LOW 20 MIN: CPT | Mod: 24 | Performed by: OBSTETRICS & GYNECOLOGY

## 2024-01-19 PROCEDURE — G0463 HOSPITAL OUTPT CLINIC VISIT: HCPCS | Performed by: OBSTETRICS & GYNECOLOGY

## 2024-01-19 ASSESSMENT — PAIN SCALES - GENERAL: PAINLEVEL: EXTREME PAIN (9)

## 2024-01-19 NOTE — LETTER
2024         RE: Tonya Mendoza  [address]  [place] [address]        Dear Colleague,    Thank you for referring your patient, Tonya Mendoza, to the [facility]. Please see a copy of my visit note below.    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       History of Present Illness:  Tonya Mendoza is a 43 year old patient referred to the gyn onc clinic for evaluation and management of a pelvic mass. Medical history significant for class III morbid obesity, COPD, paranoid schizophrenia and anxiety, and leukemia for which she has received chemotherapy & radiation. History as follows:    22: Pelvic CT to evaluate left inguinal abscess.   PELVIC ORGANS: There is a 5.3 cm right ovarian cysts. Unremarkable left ovary and uterus.   10/3/23: Pelvic ultrasound to evaluate abnormal uterine bleeding. I have personally reviewed and interpreted the ultrasound images.  Predominantly cystic multiseptated mass with some areas that appear more solid.   UTERUS: 6.1 x 1.7 x 3.7 cm. Normal homogeneous echotexture.   ENDOMETRIUM: 7 mm.   RIGHT ADNEXA: 23.2 x 21.5 x 17.9 cm complex cystic lesion.   LEFT OVARY: Not visualized  OTHER: No significant free fluid within the cul-de-sac.    10/13/23: Abdomen, pelvic CT: I have personally reviewed and interpreted the CT images. Largely cystic, multiseptated mass with some areas which appear more solid.   ABDOMEN: BISHNU.  PELVIS: Very large pelvic lesions which appears to be a multiseptated cystic  lesion measuring 23 x 17 x 23 cm. Uterus is normal in size.   No lymphadenopathy.   10/17/23: CA-125=16, CEA=3.4, Inhibin B 18.     12/4/23: Pelvic exam under anesthesia, exploratory laparotomy, hysterectomy, right oophorectomy, bilateral salpingectomy, evacuation of pelvic fluid.   -Pathology: Benign mucinous tumor (mucinous cystadenoma) with focal atypia/epithelial proliferation representing <10% of the neoplasm; weakly proliferative endometrium.      Subjective:  Tonya Mendoza returns to the gyn onc clinic today for a delayed follow-up visit, accompanied by her  Judy.   Tonya reports her recovery has been painful.   Judy reports that last week she had belching that smelled like a bowel movement.This has now resolved.   Tonya reports that when she eats she feels the food sits in her chest, and then when she belches it sometimes comes up. This has not resolved. She reports no flatus. She is having bowel movements 2-3 times per day. No blood in the stool.   Urinating without difficulty.   Tonya reports that she has been taking tylenol for pain 6x/day.She reports that she has pain on the inside, unchanged from the pain she had prior to surgery. She cannot identify how the incision feels--she reports that it itching. Judy reports that the incision looks good.   No chest pain/shortness of breath.   No fevers/chills.   Tonya is extremely tired because she did not sleep well overnight.        Medical History:  Past Medical History:   Diagnosis Date    Abnormal uterine bleeding (AUB)     Anxiety     Arthritis     COPD (chronic obstructive pulmonary disease) (H)     Depressive disorder     GERD (gastroesophageal reflux disease)     HTN (hypertension)     Leukemia (H) 2015    MDD (major depressive disorder)     Morbid obesity with BMI of 50.0-59.9, adult (H)     Paranoid schizophrenia (H)     Pelvic mass     PTSD (post-traumatic stress disorder)     Sleep apnea     TIA (transient ischemic attack)  "    Uncomplicated asthma          Surgical History:  Past Surgical History:   Procedure Laterality Date    APPENDECTOMY      BREAST BIOPSY, RT/LT Right     Benign    ELBOW SURGERY Left     \"Pins in her left elbow\"    EXAM UNDER ANESTHESIA PELVIC N/A 2023    Procedure: Exam under anesthesia pelvic;  Surgeon: Concha Cassidy MD;  Location:  OR    FOOT SURGERY Right     \"Plate in her foot\"    HYSTERECTOMY TOTAL ABDOMINAL, BILATERAL SALPINGO-OOPHORECTOMY, COMBINED N/A 2023    Procedure: Pelvic exam under anesthesia, exploratory laparotomy, hysterectomy, right oophorectomy, bilateral salpingectomy, evacuation of pelvic fluid;  Surgeon: Concha Cassidy MD;  Location:  OR          Gynecologic History:  Menstrual/Menopause status: Regular monthly periods until age 43y (see HPI).   Hormone therapy/Contraception status: None  History of abnormal cervical cancer screening:  None  History of STIS: None      Obstetric History:  OB History    Para Term  AB Living   0 0 0 0 0 0   SAB IAB Ectopic Multiple Live Births   0 0 0 0 0        Healthcare Maintenance:   Last cervical cancer screening: 10/27/23  Result: NILM, hrHPV-  Last breast cancer screenin22 Result: normal (fibroglandular densities)  Last colon cancer screening: Not indicated: Age <45-50 years   HPV vaccination status: Unvaccinated      Medications:   Current Outpatient Medications   Medication    acetaminophen (TYLENOL) 325 MG tablet    albuterol (PROAIR HFA/PROVENTIL HFA/VENTOLIN HFA) 108 (90 Base) MCG/ACT inhaler    amitriptyline (ELAVIL) 50 MG tablet    ARIPiprazole (ABILIFY) 15 MG tablet    cariprazine (VRAYLAR) 3 MG capsule    celecoxib (CELEBREX) 100 MG capsule    cetirizine (ZYRTEC) 10 MG tablet    DULoxetine (CYMBALTA) 60 MG capsule    fluticasone (FLONASE) 50 MCG/ACT nasal spray    fluticasone (FLOVENT HFA) 110 MCG/ACT inhaler    hydrochlorothiazide (MICROZIDE) 12.5 MG capsule    ibuprofen " "(ADVIL/MOTRIN) 600 MG tablet    ipratropium - albuterol 0.5 mg/2.5 mg/3 mL (DUONEB) 0.5-2.5 (3) MG/3ML neb solution    omeprazole (PRILOSEC) 20 MG DR capsule    oxyCODONE (ROXICODONE) 5 MG tablet    senna-docusate (SENOKOT-S/PERICOLACE) 8.6-50 MG tablet     No current facility-administered medications for this visit.          Allergies:   Allergies   Allergen Reactions    Codeine Nausea and Vomiting and Hives          Social History:  Patient lives independently.   Work status: on Disability. Activity: Cannot walk 1 block without shortness of breath.  Advanced Directives: None Desired Healthcare Power of : Partner/PCA Oscar \"Rohan\" Derek.  Social History     Tobacco Use    Smoking status: Every Day     Packs/day: 0.50     Years: 31.00     Additional pack years: 0.00     Total pack years: 15.50     Types: Cigarettes    Smokeless tobacco: Never    Tobacco comments:     10 Cigarettes daily, trying to quit   Vaping Use    Vaping Use: Never used   Substance Use Topics    Alcohol use: Yes     Comment: occassional    Drug use: Not Currently     Types: Marijuana     Comment: one time a week          Family History:  Family history unknown  Family History   Adopted: Yes   Family history unknown: Yes         Physical Exam:   /67   Pulse 93   Temp 97.9  F (36.6  C)   LMP 12/04/2023 (Exact Date)   SpO2 95%   There is no height or weight on file to calculate BMI.     General Appearance: healthy and alert, no distress. Intermittently falls asleep, but rouses easily to voice, and answers questions appropriately.      Musculoskeletal: extremities without edema    Skin: no lesions or rashes     Neurological: normal gait, no gross defects     Psychiatric: appropriate mood and affect             Abdominal:       abdomen soft, non-tender, non-distended. Vertical midline abdominal incision clean, dry and intact, no erythema, drainage or others signs of infection.         Laboratory Examination:  I have " independently reviewed & interpreted the 12/4/23 pathology results detailed in the HPI.         Performance Status:  ECOG Grade 3.      Assessment:  Tonya Mendoza is a 43 year old patient with a diagnosis of a benign mucinous cystadenoma of the right ovary with focal atypia/epithelial proliferation s/p hysterectomy, bilateral salpingectomy, right oophorectomy.  Recovering well postoperatively.     Medical history significant for class III morbid obesity with BMI 58, COPD, paranoid schizophrenia and anxiety. History of leukemia with chronic leukocytosis.  Surgical history significant for appendectomy due to ruptured appendix.        Plan:   1) Mucinous cystadenoma: I reviewed the pathology results with Tonya and provided her with a copy of the pathology report; also previously provided her with a copy of the pathology report via mobicanvas. Given the benign results, no additional treatment and no long-term follow-up is indicated, and Tonya is discharged from follow-up in the gyn onc clinic.  She was encouraged to call if she has any questions or concerns related to her postoperative recovery.     2.) Genetic risk factors assessed: Family history unknown    3) Labs/tests ordered: None.    4) Health maintenance issues addressed: Continue routine healthcare maintenance and management of other medical diagnoses with her primary care provider and other specialists.  -Abdominal pain: Pre-exists surgery and unchanged since surgery. Continue follow-up with her PCP for management.   -Tonya no longer needs screening Pap/HPV tests per ASCCP guidelines since her cervix has been removed.     6) Code status: Full-code.    6) Prescriptions: None.        A total of 20 minutes was spent with the patient, 15 minutes of which were spent in counseling/treatment planning, 5 minutes of which were spent in postoperative care/counseling; an additional 5 minutes was spent in chart review (including review of labs) and documentation.          Concha Cassidy MD, MS, FACOG, FACS  1/19/2024  12:17 PM

## 2024-01-20 PROBLEM — Z01.818 PREOP TESTING: Status: RESOLVED | Noted: 2023-12-04 | Resolved: 2024-01-20

## 2024-05-20 ENCOUNTER — OFFICE VISIT (OUTPATIENT)
Dept: URGENT CARE | Facility: URGENT CARE | Age: 44
End: 2024-05-20
Payer: COMMERCIAL

## 2024-05-20 ENCOUNTER — ANCILLARY PROCEDURE (OUTPATIENT)
Dept: GENERAL RADIOLOGY | Facility: CLINIC | Age: 44
End: 2024-05-20
Attending: FAMILY MEDICINE
Payer: COMMERCIAL

## 2024-05-20 VITALS
OXYGEN SATURATION: 97 % | HEART RATE: 86 BPM | DIASTOLIC BLOOD PRESSURE: 86 MMHG | SYSTOLIC BLOOD PRESSURE: 122 MMHG | TEMPERATURE: 97.7 F | RESPIRATION RATE: 20 BRPM

## 2024-05-20 DIAGNOSIS — R30.0 DYSURIA: ICD-10-CM

## 2024-05-20 DIAGNOSIS — M25.551 HIP PAIN, RIGHT: ICD-10-CM

## 2024-05-20 DIAGNOSIS — J01.90 ACUTE SINUSITIS, RECURRENCE NOT SPECIFIED, UNSPECIFIED LOCATION: Primary | ICD-10-CM

## 2024-05-20 LAB
ALBUMIN UR-MCNC: NEGATIVE MG/DL
APPEARANCE UR: CLEAR
BILIRUB UR QL STRIP: NEGATIVE
COLOR UR AUTO: YELLOW
GLUCOSE UR STRIP-MCNC: NEGATIVE MG/DL
HGB UR QL STRIP: NEGATIVE
KETONES UR STRIP-MCNC: NEGATIVE MG/DL
LEUKOCYTE ESTERASE UR QL STRIP: NEGATIVE
NITRATE UR QL: NEGATIVE
PH UR STRIP: 6 [PH] (ref 5–7)
SP GR UR STRIP: 1.01 (ref 1–1.03)
UROBILINOGEN UR STRIP-ACNC: 0.2 E.U./DL

## 2024-05-20 PROCEDURE — 99204 OFFICE O/P NEW MOD 45 MIN: CPT | Performed by: FAMILY MEDICINE

## 2024-05-20 PROCEDURE — 73502 X-RAY EXAM HIP UNI 2-3 VIEWS: CPT | Mod: TC | Performed by: RADIOLOGY

## 2024-05-20 PROCEDURE — 81003 URINALYSIS AUTO W/O SCOPE: CPT

## 2024-05-21 NOTE — PROGRESS NOTES
Assessment & Plan     Acute sinusitis, recurrence not specified, unspecified location  Possibly bacterial.   - amoxicillin-clavulanate (AUGMENTIN) 875-125 MG tablet  Dispense: 14 tablet; Refill: 0    Hip pain, right  No sign fracture. Possibly bursitis. Discussed options. She has not had relief with steroid injections in her knee and does not want this for her hip. She asks about percocet. She has an ortho team she is working with. She was recommended to follow up with them for further options.        [id number]}    No follow-ups on file.    Sarmad Hall MD  [facility]    ------------------------------------------------------------------------  Subjective     Tonya JOMAR Mendoza presents to clinic today for the following health issues:  chief complaint  HPI  2 concerns  1:right hip pain laterally since she fell a few months ago. Hurts to walk more and lay on that side. No numbness/tingling     2: 2+ weeks with facial pain, nasal congestion with thick drainage. Some ear pain and sore throat. Minimal cough. No trouble breathing.       Review of Systems        Objective    /86   Pulse 86   Temp 97.7  F (36.5  C) (Temporal)   Resp 20   LMP 12/04/2023 (Exact Date)   SpO2 97%   Physical Exam   GENERAL: alert and no distress  EYES: Eyes grossly normal to inspection  HENT: normal cephalic/atraumatic, ear canals and TM's normal, nose and mouth without ulcers or lesions, oropharynx clear, oral mucous membranes moist, and sinuses: maxillary tenderness on bilaterally  MS: right hip with decreased range of motion due to pain. tenderness to palpation  over the greater trochanter.     Xray hip with minimal djd changes and no fracture Per my interpretation.

## 2024-10-17 ENCOUNTER — OFFICE VISIT (OUTPATIENT)
Dept: URGENT CARE | Facility: URGENT CARE | Age: 44
End: 2024-10-17

## 2024-10-17 ENCOUNTER — ANCILLARY PROCEDURE (OUTPATIENT)
Dept: GENERAL RADIOLOGY | Facility: CLINIC | Age: 44
End: 2024-10-17
Attending: NURSE PRACTITIONER
Payer: COMMERCIAL

## 2024-10-17 VITALS
HEART RATE: 78 BPM | SYSTOLIC BLOOD PRESSURE: 131 MMHG | OXYGEN SATURATION: 99 % | DIASTOLIC BLOOD PRESSURE: 84 MMHG | TEMPERATURE: 97.5 F | RESPIRATION RATE: 16 BRPM
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
## 2024-10-17 DIAGNOSIS — R35.0 URINARY FREQUENCY: ICD-10-CM

## 2024-10-17 DIAGNOSIS — M54.50 ACUTE BILATERAL LOW BACK PAIN WITHOUT SCIATICA: ICD-10-CM

## 2024-10-17 DIAGNOSIS — Z99.3 WHEELCHAIR DEPENDENCE: ICD-10-CM

## 2024-10-17 PROBLEM — M17.12 PRIMARY OSTEOARTHRITIS OF LEFT KNEE: Status: ACTIVE | Noted: 2021-09-14

## 2024-10-17 LAB
ALBUMIN UR-MCNC: NEGATIVE MG/DL
APPEARANCE UR: CLEAR
BILIRUB UR QL STRIP: NEGATIVE
COLOR UR AUTO: YELLOW
GLUCOSE UR STRIP-MCNC: NEGATIVE MG/DL
HGB UR QL STRIP: NEGATIVE
KETONES UR STRIP-MCNC: NEGATIVE MG/DL
LEUKOCYTE ESTERASE UR QL STRIP: NEGATIVE
NITRATE UR QL: NEGATIVE
PH UR STRIP: 7 [PH] (ref 5–7)
SP GR UR STRIP: 1.01 (ref 1–1.03)
UROBILINOGEN UR STRIP-ACNC: 0.2 E.U./DL

## 2024-10-17 PROCEDURE — 81003 URINALYSIS AUTO W/O SCOPE: CPT

## 2024-10-17 PROCEDURE — 99214 OFFICE O/P EST MOD 30 MIN: CPT | Performed by: NURSE PRACTITIONER

## 2024-10-17 PROCEDURE — 73522 X-RAY EXAM HIPS BI 3-4 VIEWS: CPT | Mod: TC | Performed by: RADIOLOGY

## 2024-10-17 ASSESSMENT — PAIN SCALES - GENERAL: PAINLEVEL: WORST PAIN (10)
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
## 2024-12-15 ENCOUNTER — HEALTH MAINTENANCE LETTER (OUTPATIENT)
Age: 44
End: 2024-12-15

## 2025-01-19 ENCOUNTER — HEALTH MAINTENANCE LETTER (OUTPATIENT)
Age: 45
End: 2025-01-19

## 2025-03-16 ENCOUNTER — HEALTH MAINTENANCE LETTER (OUTPATIENT)
Age: 45
End: 2025-03-16

## (undated) DEVICE — LINEN TOWEL PACK X5 5464

## (undated) DEVICE — DECANTER BAG 2002S

## (undated) DEVICE — BLADE CLIPPER SGL USE 9680

## (undated) DEVICE — KIT ABDOMINAL HYST SMA15AHFSM

## (undated) DEVICE — GLOVE BIOGEL PI MICRO SZ 6.5 48565

## (undated) DEVICE — SU VICRYL 2-0 CT-2 27" UND J269H

## (undated) DEVICE — CLOSURE SYS SKIN PREMIERPRO EXOFINFUSION 4X60CM 3473

## (undated) DEVICE — SOL WATER IRRIG 1000ML BOTTLE 2F7114

## (undated) DEVICE — SU DERMABOND PRINEO 22CM CLR222US

## (undated) DEVICE — SPONGE LAP 18X18" X8435

## (undated) DEVICE — GLOVE BIOGEL PI MICRO INDICATOR UNDERGLOVE SZ 7.0 48970

## (undated) DEVICE — PANTIES MESH LG/XLG 2PK 706M2

## (undated) DEVICE — Device

## (undated) DEVICE — SU VICRYL 4-0 PS-2 18" UND J496H

## (undated) DEVICE — WIPES FOLEY CARE SURESTEP PROVON DFC100

## (undated) RX ORDER — DEXAMETHASONE SODIUM PHOSPHATE 4 MG/ML
INJECTION, SOLUTION INTRA-ARTICULAR; INTRALESIONAL; INTRAMUSCULAR; INTRAVENOUS; SOFT TISSUE
Status: DISPENSED
Start: 2023-12-04

## (undated) RX ORDER — ALBUTEROL SULFATE 90 UG/1
AEROSOL, METERED RESPIRATORY (INHALATION)
Status: DISPENSED
Start: 2023-12-04

## (undated) RX ORDER — HEPARIN SODIUM 5000 [USP'U]/.5ML
INJECTION, SOLUTION INTRAVENOUS; SUBCUTANEOUS
Status: DISPENSED
Start: 2023-12-04

## (undated) RX ORDER — FENTANYL CITRATE 50 UG/ML
INJECTION, SOLUTION INTRAMUSCULAR; INTRAVENOUS
Status: DISPENSED
Start: 2023-12-04

## (undated) RX ORDER — ONDANSETRON 2 MG/ML
INJECTION INTRAMUSCULAR; INTRAVENOUS
Status: DISPENSED
Start: 2023-12-04

## (undated) RX ORDER — FENTANYL CITRATE 0.05 MG/ML
INJECTION, SOLUTION INTRAMUSCULAR; INTRAVENOUS
Status: DISPENSED
Start: 2023-12-04

## (undated) RX ORDER — LABETALOL HYDROCHLORIDE 5 MG/ML
INJECTION, SOLUTION INTRAVENOUS
Status: DISPENSED
Start: 2023-12-04

## (undated) RX ORDER — METRONIDAZOLE 500 MG/100ML
INJECTION, SOLUTION INTRAVENOUS
Status: DISPENSED
Start: 2023-12-04

## (undated) RX ORDER — HYDROMORPHONE HCL IN WATER/PF 6 MG/30 ML
PATIENT CONTROLLED ANALGESIA SYRINGE INTRAVENOUS
Status: DISPENSED
Start: 2023-12-04

## (undated) RX ORDER — PROPOFOL 10 MG/ML
INJECTION, EMULSION INTRAVENOUS
Status: DISPENSED
Start: 2023-12-04